# Patient Record
Sex: MALE | Race: BLACK OR AFRICAN AMERICAN | NOT HISPANIC OR LATINO | URBAN - METROPOLITAN AREA
[De-identification: names, ages, dates, MRNs, and addresses within clinical notes are randomized per-mention and may not be internally consistent; named-entity substitution may affect disease eponyms.]

---

## 2017-08-08 ENCOUNTER — OFFICE VISIT - HEALTHEAST (OUTPATIENT)
Dept: ADDICTION MEDICINE | Facility: CLINIC | Age: 32
End: 2017-08-08

## 2017-08-08 DIAGNOSIS — F12.20 CANNABIS DEPENDENCE (H): ICD-10-CM

## 2017-08-10 ENCOUNTER — OFFICE VISIT - HEALTHEAST (OUTPATIENT)
Dept: ADDICTION MEDICINE | Facility: CLINIC | Age: 32
End: 2017-08-10

## 2017-08-10 DIAGNOSIS — F12.20 CANNABIS DEPENDENCE (H): ICD-10-CM

## 2017-08-14 ENCOUNTER — AMBULATORY - HEALTHEAST (OUTPATIENT)
Dept: ADDICTION MEDICINE | Facility: CLINIC | Age: 32
End: 2017-08-14

## 2017-08-15 ENCOUNTER — OFFICE VISIT - HEALTHEAST (OUTPATIENT)
Dept: ADDICTION MEDICINE | Facility: CLINIC | Age: 32
End: 2017-08-15

## 2017-08-15 DIAGNOSIS — F12.20 CANNABIS DEPENDENCE (H): ICD-10-CM

## 2017-08-16 ENCOUNTER — OFFICE VISIT - HEALTHEAST (OUTPATIENT)
Dept: ADDICTION MEDICINE | Facility: CLINIC | Age: 32
End: 2017-08-16

## 2017-08-16 DIAGNOSIS — F12.20 CANNABIS DEPENDENCE (H): ICD-10-CM

## 2017-08-17 ENCOUNTER — OFFICE VISIT - HEALTHEAST (OUTPATIENT)
Dept: ADDICTION MEDICINE | Facility: CLINIC | Age: 32
End: 2017-08-17

## 2017-08-17 DIAGNOSIS — F12.20 CANNABIS DEPENDENCE (H): ICD-10-CM

## 2017-08-18 ENCOUNTER — AMBULATORY - HEALTHEAST (OUTPATIENT)
Dept: ADDICTION MEDICINE | Facility: CLINIC | Age: 32
End: 2017-08-18

## 2017-08-21 ENCOUNTER — COMMUNICATION - HEALTHEAST (OUTPATIENT)
Dept: ADDICTION MEDICINE | Facility: CLINIC | Age: 32
End: 2017-08-21

## 2017-08-21 ENCOUNTER — OFFICE VISIT - HEALTHEAST (OUTPATIENT)
Dept: ADDICTION MEDICINE | Facility: CLINIC | Age: 32
End: 2017-08-21

## 2017-08-21 DIAGNOSIS — F12.20 CANNABIS DEPENDENCE (H): ICD-10-CM

## 2017-08-22 ENCOUNTER — OFFICE VISIT - HEALTHEAST (OUTPATIENT)
Dept: ADDICTION MEDICINE | Facility: CLINIC | Age: 32
End: 2017-08-22

## 2017-08-22 DIAGNOSIS — F12.20 CANNABIS DEPENDENCE (H): ICD-10-CM

## 2017-08-23 ENCOUNTER — OFFICE VISIT - HEALTHEAST (OUTPATIENT)
Dept: ADDICTION MEDICINE | Facility: CLINIC | Age: 32
End: 2017-08-23

## 2017-08-23 DIAGNOSIS — F12.20 CANNABIS DEPENDENCE (H): ICD-10-CM

## 2017-08-24 ENCOUNTER — OFFICE VISIT - HEALTHEAST (OUTPATIENT)
Dept: ADDICTION MEDICINE | Facility: CLINIC | Age: 32
End: 2017-08-24

## 2017-08-24 DIAGNOSIS — F12.20 CANNABIS DEPENDENCE (H): ICD-10-CM

## 2017-08-25 ENCOUNTER — AMBULATORY - HEALTHEAST (OUTPATIENT)
Dept: ADDICTION MEDICINE | Facility: CLINIC | Age: 32
End: 2017-08-25

## 2017-08-28 ENCOUNTER — OFFICE VISIT - HEALTHEAST (OUTPATIENT)
Dept: ADDICTION MEDICINE | Facility: CLINIC | Age: 32
End: 2017-08-28

## 2017-08-28 DIAGNOSIS — F12.20 CANNABIS DEPENDENCE (H): ICD-10-CM

## 2017-08-29 ENCOUNTER — AMBULATORY - HEALTHEAST (OUTPATIENT)
Dept: LAB | Facility: CLINIC | Age: 32
End: 2017-08-29

## 2017-08-29 ENCOUNTER — OFFICE VISIT - HEALTHEAST (OUTPATIENT)
Dept: ADDICTION MEDICINE | Facility: CLINIC | Age: 32
End: 2017-08-29

## 2017-08-29 DIAGNOSIS — F12.20 CANNABIS DEPENDENCE (H): ICD-10-CM

## 2017-08-30 ENCOUNTER — COMMUNICATION - HEALTHEAST (OUTPATIENT)
Dept: ADDICTION MEDICINE | Facility: CLINIC | Age: 32
End: 2017-08-30

## 2017-08-30 ENCOUNTER — OFFICE VISIT - HEALTHEAST (OUTPATIENT)
Dept: ADDICTION MEDICINE | Facility: CLINIC | Age: 32
End: 2017-08-30

## 2017-08-30 DIAGNOSIS — F12.20 CANNABIS DEPENDENCE (H): ICD-10-CM

## 2017-09-01 ENCOUNTER — AMBULATORY - HEALTHEAST (OUTPATIENT)
Dept: ADDICTION MEDICINE | Facility: CLINIC | Age: 32
End: 2017-09-01

## 2017-09-05 ENCOUNTER — OFFICE VISIT - HEALTHEAST (OUTPATIENT)
Dept: ADDICTION MEDICINE | Facility: CLINIC | Age: 32
End: 2017-09-05

## 2017-09-05 DIAGNOSIS — F12.20 CANNABIS DEPENDENCE (H): ICD-10-CM

## 2017-09-06 ENCOUNTER — OFFICE VISIT - HEALTHEAST (OUTPATIENT)
Dept: ADDICTION MEDICINE | Facility: CLINIC | Age: 32
End: 2017-09-06

## 2017-09-06 DIAGNOSIS — F12.20 CANNABIS DEPENDENCE (H): ICD-10-CM

## 2017-09-07 ENCOUNTER — OFFICE VISIT - HEALTHEAST (OUTPATIENT)
Dept: ADDICTION MEDICINE | Facility: CLINIC | Age: 32
End: 2017-09-07

## 2017-09-07 DIAGNOSIS — F12.20 CANNABIS DEPENDENCE (H): ICD-10-CM

## 2017-09-08 ENCOUNTER — AMBULATORY - HEALTHEAST (OUTPATIENT)
Dept: ADDICTION MEDICINE | Facility: CLINIC | Age: 32
End: 2017-09-08

## 2017-09-11 ENCOUNTER — OFFICE VISIT - HEALTHEAST (OUTPATIENT)
Dept: ADDICTION MEDICINE | Facility: CLINIC | Age: 32
End: 2017-09-11

## 2017-09-11 DIAGNOSIS — F12.20 CANNABIS DEPENDENCE (H): ICD-10-CM

## 2017-09-12 ENCOUNTER — AMBULATORY - HEALTHEAST (OUTPATIENT)
Dept: LAB | Facility: CLINIC | Age: 32
End: 2017-09-12

## 2017-09-12 ENCOUNTER — OFFICE VISIT - HEALTHEAST (OUTPATIENT)
Dept: ADDICTION MEDICINE | Facility: CLINIC | Age: 32
End: 2017-09-12

## 2017-09-12 DIAGNOSIS — F12.20 CANNABIS DEPENDENCE (H): ICD-10-CM

## 2017-09-13 ENCOUNTER — OFFICE VISIT - HEALTHEAST (OUTPATIENT)
Dept: ADDICTION MEDICINE | Facility: CLINIC | Age: 32
End: 2017-09-13

## 2017-09-13 DIAGNOSIS — F12.20 CANNABIS DEPENDENCE (H): ICD-10-CM

## 2017-09-14 ENCOUNTER — COMMUNICATION - HEALTHEAST (OUTPATIENT)
Dept: ADDICTION MEDICINE | Facility: CLINIC | Age: 32
End: 2017-09-14

## 2017-09-15 ENCOUNTER — AMBULATORY - HEALTHEAST (OUTPATIENT)
Dept: ADDICTION MEDICINE | Facility: CLINIC | Age: 32
End: 2017-09-15

## 2017-09-18 ENCOUNTER — OFFICE VISIT - HEALTHEAST (OUTPATIENT)
Dept: ADDICTION MEDICINE | Facility: CLINIC | Age: 32
End: 2017-09-18

## 2017-09-18 ENCOUNTER — COMMUNICATION - HEALTHEAST (OUTPATIENT)
Dept: ADDICTION MEDICINE | Facility: CLINIC | Age: 32
End: 2017-09-18

## 2017-09-18 DIAGNOSIS — F12.20 CANNABIS DEPENDENCE (H): ICD-10-CM

## 2017-09-19 ENCOUNTER — OFFICE VISIT - HEALTHEAST (OUTPATIENT)
Dept: ADDICTION MEDICINE | Facility: CLINIC | Age: 32
End: 2017-09-19

## 2017-09-19 DIAGNOSIS — F12.20 CANNABIS DEPENDENCE (H): ICD-10-CM

## 2017-09-20 ENCOUNTER — OFFICE VISIT - HEALTHEAST (OUTPATIENT)
Dept: ADDICTION MEDICINE | Facility: CLINIC | Age: 32
End: 2017-09-20

## 2017-09-20 ENCOUNTER — AMBULATORY - HEALTHEAST (OUTPATIENT)
Dept: ADDICTION MEDICINE | Facility: CLINIC | Age: 32
End: 2017-09-20

## 2017-09-20 DIAGNOSIS — F12.20 CANNABIS DEPENDENCE (H): ICD-10-CM

## 2017-09-21 ENCOUNTER — OFFICE VISIT - HEALTHEAST (OUTPATIENT)
Dept: ADDICTION MEDICINE | Facility: CLINIC | Age: 32
End: 2017-09-21

## 2017-09-21 DIAGNOSIS — F12.20 CANNABIS DEPENDENCE (H): ICD-10-CM

## 2017-09-22 ENCOUNTER — AMBULATORY - HEALTHEAST (OUTPATIENT)
Dept: ADDICTION MEDICINE | Facility: CLINIC | Age: 32
End: 2017-09-22

## 2017-09-25 ENCOUNTER — OFFICE VISIT - HEALTHEAST (OUTPATIENT)
Dept: ADDICTION MEDICINE | Facility: CLINIC | Age: 32
End: 2017-09-25

## 2017-09-25 DIAGNOSIS — F12.20 CANNABIS DEPENDENCE (H): ICD-10-CM

## 2017-09-26 ENCOUNTER — OFFICE VISIT - HEALTHEAST (OUTPATIENT)
Dept: ADDICTION MEDICINE | Facility: CLINIC | Age: 32
End: 2017-09-26

## 2017-09-26 ENCOUNTER — AMBULATORY - HEALTHEAST (OUTPATIENT)
Dept: LAB | Facility: CLINIC | Age: 32
End: 2017-09-26

## 2017-09-26 DIAGNOSIS — F12.20 CANNABIS DEPENDENCE (H): ICD-10-CM

## 2017-09-27 ENCOUNTER — OFFICE VISIT - HEALTHEAST (OUTPATIENT)
Dept: ADDICTION MEDICINE | Facility: CLINIC | Age: 32
End: 2017-09-27

## 2017-09-27 DIAGNOSIS — F12.20 CANNABIS DEPENDENCE (H): ICD-10-CM

## 2017-09-28 ENCOUNTER — OFFICE VISIT - HEALTHEAST (OUTPATIENT)
Dept: ADDICTION MEDICINE | Facility: CLINIC | Age: 32
End: 2017-09-28

## 2017-09-28 DIAGNOSIS — F12.20 CANNABIS DEPENDENCE (H): ICD-10-CM

## 2017-09-29 ENCOUNTER — AMBULATORY - HEALTHEAST (OUTPATIENT)
Dept: ADDICTION MEDICINE | Facility: CLINIC | Age: 32
End: 2017-09-29

## 2017-10-02 ENCOUNTER — OFFICE VISIT - HEALTHEAST (OUTPATIENT)
Dept: ADDICTION MEDICINE | Facility: CLINIC | Age: 32
End: 2017-10-02

## 2017-10-02 ENCOUNTER — AMBULATORY - HEALTHEAST (OUTPATIENT)
Dept: LAB | Facility: CLINIC | Age: 32
End: 2017-10-02

## 2017-10-02 DIAGNOSIS — F12.20 CANNABIS DEPENDENCE (H): ICD-10-CM

## 2017-10-03 ENCOUNTER — OFFICE VISIT - HEALTHEAST (OUTPATIENT)
Dept: ADDICTION MEDICINE | Facility: CLINIC | Age: 32
End: 2017-10-03

## 2017-10-03 DIAGNOSIS — F12.20 CANNABIS DEPENDENCE (H): ICD-10-CM

## 2017-10-04 ENCOUNTER — OFFICE VISIT - HEALTHEAST (OUTPATIENT)
Dept: ADDICTION MEDICINE | Facility: CLINIC | Age: 32
End: 2017-10-04

## 2017-10-04 DIAGNOSIS — F12.20 CANNABIS DEPENDENCE (H): ICD-10-CM

## 2017-10-05 ENCOUNTER — OFFICE VISIT - HEALTHEAST (OUTPATIENT)
Dept: ADDICTION MEDICINE | Facility: CLINIC | Age: 32
End: 2017-10-05

## 2017-10-05 DIAGNOSIS — F12.20 CANNABIS DEPENDENCE (H): ICD-10-CM

## 2017-10-06 ENCOUNTER — AMBULATORY - HEALTHEAST (OUTPATIENT)
Dept: ADDICTION MEDICINE | Facility: CLINIC | Age: 32
End: 2017-10-06

## 2017-10-09 ENCOUNTER — OFFICE VISIT - HEALTHEAST (OUTPATIENT)
Dept: ADDICTION MEDICINE | Facility: CLINIC | Age: 32
End: 2017-10-09

## 2017-10-09 DIAGNOSIS — F12.20 CANNABIS DEPENDENCE (H): ICD-10-CM

## 2017-10-10 ENCOUNTER — OFFICE VISIT - HEALTHEAST (OUTPATIENT)
Dept: ADDICTION MEDICINE | Facility: CLINIC | Age: 32
End: 2017-10-10

## 2017-10-10 ENCOUNTER — AMBULATORY - HEALTHEAST (OUTPATIENT)
Dept: LAB | Facility: CLINIC | Age: 32
End: 2017-10-10

## 2017-10-10 DIAGNOSIS — F12.20 CANNABIS DEPENDENCE (H): ICD-10-CM

## 2017-10-11 ENCOUNTER — OFFICE VISIT - HEALTHEAST (OUTPATIENT)
Dept: ADDICTION MEDICINE | Facility: CLINIC | Age: 32
End: 2017-10-11

## 2017-10-11 DIAGNOSIS — F12.20 CANNABIS DEPENDENCE (H): ICD-10-CM

## 2017-10-12 ENCOUNTER — OFFICE VISIT - HEALTHEAST (OUTPATIENT)
Dept: ADDICTION MEDICINE | Facility: CLINIC | Age: 32
End: 2017-10-12

## 2017-10-12 DIAGNOSIS — F12.20 CANNABIS DEPENDENCE (H): ICD-10-CM

## 2017-10-13 ENCOUNTER — AMBULATORY - HEALTHEAST (OUTPATIENT)
Dept: ADDICTION MEDICINE | Facility: CLINIC | Age: 32
End: 2017-10-13

## 2017-10-16 ENCOUNTER — OFFICE VISIT - HEALTHEAST (OUTPATIENT)
Dept: ADDICTION MEDICINE | Facility: CLINIC | Age: 32
End: 2017-10-16

## 2017-10-16 DIAGNOSIS — F12.20 CANNABIS DEPENDENCE (H): ICD-10-CM

## 2017-10-17 ENCOUNTER — OFFICE VISIT - HEALTHEAST (OUTPATIENT)
Dept: ADDICTION MEDICINE | Facility: CLINIC | Age: 32
End: 2017-10-17

## 2017-10-17 DIAGNOSIS — F12.20 CANNABIS DEPENDENCE (H): ICD-10-CM

## 2017-10-18 ENCOUNTER — OFFICE VISIT - HEALTHEAST (OUTPATIENT)
Dept: ADDICTION MEDICINE | Facility: CLINIC | Age: 32
End: 2017-10-18

## 2017-10-18 ENCOUNTER — AMBULATORY - HEALTHEAST (OUTPATIENT)
Dept: LAB | Facility: CLINIC | Age: 32
End: 2017-10-18

## 2017-10-18 DIAGNOSIS — F12.20 CANNABIS DEPENDENCE (H): ICD-10-CM

## 2017-10-19 ENCOUNTER — OFFICE VISIT - HEALTHEAST (OUTPATIENT)
Dept: ADDICTION MEDICINE | Facility: CLINIC | Age: 32
End: 2017-10-19

## 2017-10-19 DIAGNOSIS — F12.20 CANNABIS DEPENDENCE (H): ICD-10-CM

## 2017-10-20 ENCOUNTER — AMBULATORY - HEALTHEAST (OUTPATIENT)
Dept: ADDICTION MEDICINE | Facility: CLINIC | Age: 32
End: 2017-10-20

## 2017-10-20 ENCOUNTER — COMMUNICATION - HEALTHEAST (OUTPATIENT)
Dept: ADDICTION MEDICINE | Facility: CLINIC | Age: 32
End: 2017-10-20

## 2017-10-23 ENCOUNTER — OFFICE VISIT - HEALTHEAST (OUTPATIENT)
Dept: ADDICTION MEDICINE | Facility: CLINIC | Age: 32
End: 2017-10-23

## 2017-10-23 DIAGNOSIS — F12.20 CANNABIS DEPENDENCE (H): ICD-10-CM

## 2017-10-24 ENCOUNTER — OFFICE VISIT - HEALTHEAST (OUTPATIENT)
Dept: ADDICTION MEDICINE | Facility: CLINIC | Age: 32
End: 2017-10-24

## 2017-10-24 DIAGNOSIS — F12.20 CANNABIS DEPENDENCE (H): ICD-10-CM

## 2017-10-26 ENCOUNTER — AMBULATORY - HEALTHEAST (OUTPATIENT)
Dept: LAB | Facility: CLINIC | Age: 32
End: 2017-10-26

## 2017-10-26 ENCOUNTER — OFFICE VISIT - HEALTHEAST (OUTPATIENT)
Dept: ADDICTION MEDICINE | Facility: CLINIC | Age: 32
End: 2017-10-26

## 2017-10-26 DIAGNOSIS — F12.20 CANNABIS DEPENDENCE (H): ICD-10-CM

## 2017-10-27 ENCOUNTER — AMBULATORY - HEALTHEAST (OUTPATIENT)
Dept: ADDICTION MEDICINE | Facility: CLINIC | Age: 32
End: 2017-10-27

## 2017-10-30 ENCOUNTER — OFFICE VISIT - HEALTHEAST (OUTPATIENT)
Dept: ADDICTION MEDICINE | Facility: CLINIC | Age: 32
End: 2017-10-30

## 2017-10-30 DIAGNOSIS — F12.20 CANNABIS DEPENDENCE (H): ICD-10-CM

## 2017-11-02 ENCOUNTER — AMBULATORY - HEALTHEAST (OUTPATIENT)
Dept: LAB | Facility: CLINIC | Age: 32
End: 2017-11-02

## 2017-11-02 ENCOUNTER — OFFICE VISIT - HEALTHEAST (OUTPATIENT)
Dept: ADDICTION MEDICINE | Facility: CLINIC | Age: 32
End: 2017-11-02

## 2017-11-02 DIAGNOSIS — F12.20 CANNABIS DEPENDENCE (H): ICD-10-CM

## 2017-11-03 ENCOUNTER — AMBULATORY - HEALTHEAST (OUTPATIENT)
Dept: ADDICTION MEDICINE | Facility: CLINIC | Age: 32
End: 2017-11-03

## 2017-11-06 ENCOUNTER — OFFICE VISIT - HEALTHEAST (OUTPATIENT)
Dept: ADDICTION MEDICINE | Facility: CLINIC | Age: 32
End: 2017-11-06

## 2017-11-06 DIAGNOSIS — F12.20 CANNABIS DEPENDENCE (H): ICD-10-CM

## 2017-11-07 ENCOUNTER — AMBULATORY - HEALTHEAST (OUTPATIENT)
Dept: ADDICTION MEDICINE | Facility: CLINIC | Age: 32
End: 2017-11-07

## 2017-11-16 ENCOUNTER — OFFICE VISIT - HEALTHEAST (OUTPATIENT)
Dept: ADDICTION MEDICINE | Facility: CLINIC | Age: 32
End: 2017-11-16

## 2017-11-16 DIAGNOSIS — F12.20 CANNABIS DEPENDENCE (H): ICD-10-CM

## 2017-11-20 ENCOUNTER — AMBULATORY - HEALTHEAST (OUTPATIENT)
Dept: ADDICTION MEDICINE | Facility: CLINIC | Age: 32
End: 2017-11-20

## 2017-11-30 ENCOUNTER — OFFICE VISIT - HEALTHEAST (OUTPATIENT)
Dept: ADDICTION MEDICINE | Facility: CLINIC | Age: 32
End: 2017-11-30

## 2017-11-30 DIAGNOSIS — F12.20 CANNABIS DEPENDENCE (H): ICD-10-CM

## 2017-12-06 ENCOUNTER — AMBULATORY - HEALTHEAST (OUTPATIENT)
Dept: ADDICTION MEDICINE | Facility: CLINIC | Age: 32
End: 2017-12-06

## 2017-12-14 ENCOUNTER — OFFICE VISIT - HEALTHEAST (OUTPATIENT)
Dept: ADDICTION MEDICINE | Facility: CLINIC | Age: 32
End: 2017-12-14

## 2017-12-14 DIAGNOSIS — F12.20 CANNABIS DEPENDENCE (H): ICD-10-CM

## 2017-12-18 ENCOUNTER — AMBULATORY - HEALTHEAST (OUTPATIENT)
Dept: ADDICTION MEDICINE | Facility: CLINIC | Age: 32
End: 2017-12-18

## 2017-12-21 ENCOUNTER — OFFICE VISIT - HEALTHEAST (OUTPATIENT)
Dept: ADDICTION MEDICINE | Facility: CLINIC | Age: 32
End: 2017-12-21

## 2017-12-21 DIAGNOSIS — F12.20 CANNABIS DEPENDENCE (H): ICD-10-CM

## 2017-12-27 ENCOUNTER — AMBULATORY - HEALTHEAST (OUTPATIENT)
Dept: ADDICTION MEDICINE | Facility: CLINIC | Age: 32
End: 2017-12-27

## 2017-12-28 ENCOUNTER — OFFICE VISIT - HEALTHEAST (OUTPATIENT)
Dept: ADDICTION MEDICINE | Facility: CLINIC | Age: 32
End: 2017-12-28

## 2017-12-28 DIAGNOSIS — F12.20 CANNABIS DEPENDENCE (H): ICD-10-CM

## 2018-01-02 ENCOUNTER — AMBULATORY - HEALTHEAST (OUTPATIENT)
Dept: ADDICTION MEDICINE | Facility: CLINIC | Age: 33
End: 2018-01-02

## 2018-01-04 ENCOUNTER — OFFICE VISIT - HEALTHEAST (OUTPATIENT)
Dept: ADDICTION MEDICINE | Facility: CLINIC | Age: 33
End: 2018-01-04

## 2018-01-04 DIAGNOSIS — F12.20 CANNABIS DEPENDENCE (H): ICD-10-CM

## 2018-01-08 ENCOUNTER — AMBULATORY - HEALTHEAST (OUTPATIENT)
Dept: ADDICTION MEDICINE | Facility: CLINIC | Age: 33
End: 2018-01-08

## 2018-01-11 ENCOUNTER — OFFICE VISIT - HEALTHEAST (OUTPATIENT)
Dept: ADDICTION MEDICINE | Facility: CLINIC | Age: 33
End: 2018-01-11

## 2018-01-11 DIAGNOSIS — F12.20 CANNABIS DEPENDENCE (H): ICD-10-CM

## 2018-01-15 ENCOUNTER — AMBULATORY - HEALTHEAST (OUTPATIENT)
Dept: ADDICTION MEDICINE | Facility: CLINIC | Age: 33
End: 2018-01-15

## 2018-01-18 ENCOUNTER — OFFICE VISIT - HEALTHEAST (OUTPATIENT)
Dept: ADDICTION MEDICINE | Facility: CLINIC | Age: 33
End: 2018-01-18

## 2018-01-18 ENCOUNTER — AMBULATORY - HEALTHEAST (OUTPATIENT)
Dept: ADDICTION MEDICINE | Facility: CLINIC | Age: 33
End: 2018-01-18

## 2018-01-18 DIAGNOSIS — F12.20 CANNABIS DEPENDENCE (H): ICD-10-CM

## 2018-01-22 ENCOUNTER — AMBULATORY - HEALTHEAST (OUTPATIENT)
Dept: ADDICTION MEDICINE | Facility: CLINIC | Age: 33
End: 2018-01-22

## 2018-01-25 ENCOUNTER — OFFICE VISIT - HEALTHEAST (OUTPATIENT)
Dept: ADDICTION MEDICINE | Facility: CLINIC | Age: 33
End: 2018-01-25

## 2018-01-25 DIAGNOSIS — F12.20 CANNABIS DEPENDENCE (H): ICD-10-CM

## 2018-01-29 ENCOUNTER — AMBULATORY - HEALTHEAST (OUTPATIENT)
Dept: ADDICTION MEDICINE | Facility: CLINIC | Age: 33
End: 2018-01-29

## 2018-02-01 ENCOUNTER — OFFICE VISIT - HEALTHEAST (OUTPATIENT)
Dept: ADDICTION MEDICINE | Facility: CLINIC | Age: 33
End: 2018-02-01

## 2018-02-01 DIAGNOSIS — F12.20 CANNABIS DEPENDENCE (H): ICD-10-CM

## 2018-02-05 ENCOUNTER — COMMUNICATION - HEALTHEAST (OUTPATIENT)
Dept: ADDICTION MEDICINE | Facility: CLINIC | Age: 33
End: 2018-02-05

## 2018-02-05 ENCOUNTER — AMBULATORY - HEALTHEAST (OUTPATIENT)
Dept: ADDICTION MEDICINE | Facility: CLINIC | Age: 33
End: 2018-02-05

## 2018-02-08 ENCOUNTER — AMBULATORY - HEALTHEAST (OUTPATIENT)
Dept: LAB | Facility: CLINIC | Age: 33
End: 2018-02-08

## 2018-02-08 ENCOUNTER — OFFICE VISIT - HEALTHEAST (OUTPATIENT)
Dept: ADDICTION MEDICINE | Facility: CLINIC | Age: 33
End: 2018-02-08

## 2018-02-08 DIAGNOSIS — F12.20 CANNABIS DEPENDENCE (H): ICD-10-CM

## 2018-02-08 LAB
AMPHETAMINES UR QL SCN: NORMAL
BARBITURATES UR QL: NORMAL
BENZODIAZ UR QL: NORMAL
CANNABINOIDS UR QL SCN: NORMAL
COCAINE UR QL: NORMAL
CREAT UR-MCNC: 332.8 MG/DL
METHADONE UR QL SCN: NORMAL
OPIATES UR QL SCN: NORMAL
OXYCODONE UR QL: NORMAL
PCP UR QL SCN: NORMAL

## 2018-02-09 ENCOUNTER — AMBULATORY - HEALTHEAST (OUTPATIENT)
Dept: ADDICTION MEDICINE | Facility: CLINIC | Age: 33
End: 2018-02-09

## 2018-02-13 ENCOUNTER — OFFICE VISIT - HEALTHEAST (OUTPATIENT)
Dept: ADDICTION MEDICINE | Facility: CLINIC | Age: 33
End: 2018-02-13

## 2018-02-13 ENCOUNTER — AMBULATORY - HEALTHEAST (OUTPATIENT)
Dept: LAB | Facility: CLINIC | Age: 33
End: 2018-02-13

## 2018-02-13 DIAGNOSIS — F12.20 CANNABIS DEPENDENCE (H): ICD-10-CM

## 2018-02-13 LAB
AMPHETAMINES UR QL SCN: NORMAL
BARBITURATES UR QL: NORMAL
BENZODIAZ UR QL: NORMAL
CANNABINOIDS UR QL SCN: NORMAL
COCAINE UR QL: NORMAL
CREAT UR-MCNC: 196.5 MG/DL
METHADONE UR QL SCN: NORMAL
OPIATES UR QL SCN: NORMAL
OXYCODONE UR QL: NORMAL
PCP UR QL SCN: NORMAL

## 2018-02-15 ENCOUNTER — OFFICE VISIT - HEALTHEAST (OUTPATIENT)
Dept: ADDICTION MEDICINE | Facility: CLINIC | Age: 33
End: 2018-02-15

## 2018-02-15 ENCOUNTER — AMBULATORY - HEALTHEAST (OUTPATIENT)
Dept: LAB | Facility: CLINIC | Age: 33
End: 2018-02-15

## 2018-02-15 DIAGNOSIS — F12.20 CANNABIS DEPENDENCE (H): ICD-10-CM

## 2018-02-15 LAB
AMPHETAMINES UR QL SCN: NORMAL
BARBITURATES UR QL: NORMAL
BENZODIAZ UR QL: NORMAL
CANNABINOIDS UR QL SCN: NORMAL
COCAINE UR QL: NORMAL
CREAT UR-MCNC: 295.8 MG/DL
METHADONE UR QL SCN: NORMAL
OPIATES UR QL SCN: NORMAL
OXYCODONE UR QL: NORMAL
PCP UR QL SCN: NORMAL

## 2018-02-16 ENCOUNTER — AMBULATORY - HEALTHEAST (OUTPATIENT)
Dept: ADDICTION MEDICINE | Facility: CLINIC | Age: 33
End: 2018-02-16

## 2018-02-20 ENCOUNTER — OFFICE VISIT - HEALTHEAST (OUTPATIENT)
Dept: ADDICTION MEDICINE | Facility: CLINIC | Age: 33
End: 2018-02-20

## 2018-02-20 ENCOUNTER — AMBULATORY - HEALTHEAST (OUTPATIENT)
Dept: LAB | Facility: CLINIC | Age: 33
End: 2018-02-20

## 2018-02-20 DIAGNOSIS — F12.20 CANNABIS DEPENDENCE (H): ICD-10-CM

## 2018-02-20 LAB
AMPHETAMINES UR QL SCN: NORMAL
BARBITURATES UR QL: NORMAL
BENZODIAZ UR QL: NORMAL
CANNABINOIDS UR QL SCN: NORMAL
COCAINE UR QL: NORMAL
CREAT UR-MCNC: 90.1 MG/DL
METHADONE UR QL SCN: NORMAL
OPIATES UR QL SCN: NORMAL
OXYCODONE UR QL: NORMAL
PCP UR QL SCN: NORMAL

## 2018-02-22 ENCOUNTER — AMBULATORY - HEALTHEAST (OUTPATIENT)
Dept: LAB | Facility: CLINIC | Age: 33
End: 2018-02-22

## 2018-02-22 ENCOUNTER — OFFICE VISIT - HEALTHEAST (OUTPATIENT)
Dept: ADDICTION MEDICINE | Facility: CLINIC | Age: 33
End: 2018-02-22

## 2018-02-22 DIAGNOSIS — F12.20 CANNABIS DEPENDENCE (H): ICD-10-CM

## 2018-02-22 LAB
AMPHETAMINES UR QL SCN: NORMAL
BARBITURATES UR QL: NORMAL
BENZODIAZ UR QL: NORMAL
CANNABINOIDS UR QL SCN: NORMAL
COCAINE UR QL: NORMAL
CREAT UR-MCNC: 282.6 MG/DL
METHADONE UR QL SCN: NORMAL
OPIATES UR QL SCN: NORMAL
OXYCODONE UR QL: NORMAL
PCP UR QL SCN: NORMAL

## 2018-02-23 ENCOUNTER — AMBULATORY - HEALTHEAST (OUTPATIENT)
Dept: ADDICTION MEDICINE | Facility: CLINIC | Age: 33
End: 2018-02-23

## 2018-02-27 ENCOUNTER — OFFICE VISIT - HEALTHEAST (OUTPATIENT)
Dept: ADDICTION MEDICINE | Facility: CLINIC | Age: 33
End: 2018-02-27

## 2018-02-27 ENCOUNTER — AMBULATORY - HEALTHEAST (OUTPATIENT)
Dept: LAB | Facility: CLINIC | Age: 33
End: 2018-02-27

## 2018-02-27 DIAGNOSIS — F12.20 CANNABIS DEPENDENCE (H): ICD-10-CM

## 2018-02-27 LAB
AMPHETAMINES UR QL SCN: NORMAL
BARBITURATES UR QL: NORMAL
BENZODIAZ UR QL: NORMAL
CANNABINOIDS UR QL SCN: NORMAL
COCAINE UR QL: NORMAL
CREAT UR-MCNC: 416 MG/DL
METHADONE UR QL SCN: NORMAL
OPIATES UR QL SCN: NORMAL
OXYCODONE UR QL: NORMAL
PCP UR QL SCN: NORMAL

## 2018-02-28 ENCOUNTER — COMMUNICATION - HEALTHEAST (OUTPATIENT)
Dept: INTERNAL MEDICINE | Facility: CLINIC | Age: 33
End: 2018-02-28

## 2018-03-01 ENCOUNTER — OFFICE VISIT - HEALTHEAST (OUTPATIENT)
Dept: ADDICTION MEDICINE | Facility: CLINIC | Age: 33
End: 2018-03-01

## 2018-03-01 ENCOUNTER — OFFICE VISIT - HEALTHEAST (OUTPATIENT)
Dept: INTERNAL MEDICINE | Facility: CLINIC | Age: 33
End: 2018-03-01

## 2018-03-01 ENCOUNTER — AMBULATORY - HEALTHEAST (OUTPATIENT)
Dept: LAB | Facility: CLINIC | Age: 33
End: 2018-03-01

## 2018-03-01 DIAGNOSIS — Z71.6 ENCOUNTER FOR SMOKING CESSATION COUNSELING: ICD-10-CM

## 2018-03-01 DIAGNOSIS — F12.20 CANNABIS DEPENDENCE (H): ICD-10-CM

## 2018-03-01 LAB
AMPHETAMINES UR QL SCN: NORMAL
BARBITURATES UR QL: NORMAL
BENZODIAZ UR QL: NORMAL
CANNABINOIDS UR QL SCN: NORMAL
COCAINE UR QL: NORMAL
CREAT UR-MCNC: 181.2 MG/DL
METHADONE UR QL SCN: NORMAL
OPIATES UR QL SCN: NORMAL
OXYCODONE UR QL: NORMAL
PCP UR QL SCN: NORMAL

## 2018-03-01 ASSESSMENT — MIFFLIN-ST. JEOR: SCORE: 1737.21

## 2018-03-02 ENCOUNTER — AMBULATORY - HEALTHEAST (OUTPATIENT)
Dept: ADDICTION MEDICINE | Facility: CLINIC | Age: 33
End: 2018-03-02

## 2018-03-06 ENCOUNTER — OFFICE VISIT - HEALTHEAST (OUTPATIENT)
Dept: ADDICTION MEDICINE | Facility: CLINIC | Age: 33
End: 2018-03-06

## 2018-03-06 ENCOUNTER — AMBULATORY - HEALTHEAST (OUTPATIENT)
Dept: LAB | Facility: CLINIC | Age: 33
End: 2018-03-06

## 2018-03-06 DIAGNOSIS — F12.20 CANNABIS DEPENDENCE (H): ICD-10-CM

## 2018-03-06 LAB
AMPHETAMINES UR QL SCN: NORMAL
BARBITURATES UR QL: NORMAL
BENZODIAZ UR QL: NORMAL
CANNABINOIDS UR QL SCN: NORMAL
COCAINE UR QL: NORMAL
CREAT UR-MCNC: 435.2 MG/DL
METHADONE UR QL SCN: NORMAL
OPIATES UR QL SCN: NORMAL
OXYCODONE UR QL: NORMAL
PCP UR QL SCN: NORMAL

## 2018-03-08 ENCOUNTER — OFFICE VISIT - HEALTHEAST (OUTPATIENT)
Dept: ADDICTION MEDICINE | Facility: CLINIC | Age: 33
End: 2018-03-08

## 2018-03-08 ENCOUNTER — AMBULATORY - HEALTHEAST (OUTPATIENT)
Dept: LAB | Facility: CLINIC | Age: 33
End: 2018-03-08

## 2018-03-08 DIAGNOSIS — F12.20 CANNABIS DEPENDENCE (H): ICD-10-CM

## 2018-03-08 LAB
AMPHETAMINES UR QL SCN: NORMAL
BARBITURATES UR QL: NORMAL
BENZODIAZ UR QL: NORMAL
CANNABINOIDS UR QL SCN: NORMAL
COCAINE UR QL: NORMAL
CREAT UR-MCNC: 117.6 MG/DL
METHADONE UR QL SCN: NORMAL
OPIATES UR QL SCN: NORMAL
OXYCODONE UR QL: NORMAL
PCP UR QL SCN: NORMAL

## 2018-03-11 ENCOUNTER — AMBULATORY - HEALTHEAST (OUTPATIENT)
Dept: ADDICTION MEDICINE | Facility: CLINIC | Age: 33
End: 2018-03-11

## 2018-03-13 ENCOUNTER — AMBULATORY - HEALTHEAST (OUTPATIENT)
Dept: LAB | Facility: CLINIC | Age: 33
End: 2018-03-13

## 2018-03-13 ENCOUNTER — OFFICE VISIT - HEALTHEAST (OUTPATIENT)
Dept: ADDICTION MEDICINE | Facility: CLINIC | Age: 33
End: 2018-03-13

## 2018-03-13 DIAGNOSIS — F12.20 CANNABIS DEPENDENCE (H): ICD-10-CM

## 2018-03-13 LAB
AMPHETAMINES UR QL SCN: NORMAL
BARBITURATES UR QL: NORMAL
BENZODIAZ UR QL: NORMAL
CANNABINOIDS UR QL SCN: NORMAL
COCAINE UR QL: NORMAL
CREAT UR-MCNC: 23.3 MG/DL
METHADONE UR QL SCN: NORMAL
OPIATES UR QL SCN: NORMAL
OXYCODONE UR QL: NORMAL
PCP UR QL SCN: NORMAL

## 2018-03-15 ENCOUNTER — OFFICE VISIT - HEALTHEAST (OUTPATIENT)
Dept: ADDICTION MEDICINE | Facility: CLINIC | Age: 33
End: 2018-03-15

## 2018-03-15 ENCOUNTER — AMBULATORY - HEALTHEAST (OUTPATIENT)
Dept: ADDICTION MEDICINE | Facility: CLINIC | Age: 33
End: 2018-03-15

## 2018-03-15 ENCOUNTER — AMBULATORY - HEALTHEAST (OUTPATIENT)
Dept: LAB | Facility: CLINIC | Age: 33
End: 2018-03-15

## 2018-03-15 DIAGNOSIS — F12.20 CANNABIS DEPENDENCE (H): ICD-10-CM

## 2018-03-15 LAB
AMPHETAMINES UR QL SCN: NORMAL
BARBITURATES UR QL: NORMAL
BENZODIAZ UR QL: NORMAL
CANNABINOIDS UR QL SCN: NORMAL
COCAINE UR QL: NORMAL
CREAT UR-MCNC: 139.5 MG/DL
METHADONE UR QL SCN: NORMAL
OPIATES UR QL SCN: NORMAL
OXYCODONE UR QL: NORMAL
PCP UR QL SCN: NORMAL

## 2018-03-20 ENCOUNTER — OFFICE VISIT - HEALTHEAST (OUTPATIENT)
Dept: ADDICTION MEDICINE | Facility: CLINIC | Age: 33
End: 2018-03-20

## 2018-03-20 ENCOUNTER — AMBULATORY - HEALTHEAST (OUTPATIENT)
Dept: LAB | Facility: CLINIC | Age: 33
End: 2018-03-20

## 2018-03-20 DIAGNOSIS — F12.20 CANNABIS DEPENDENCE (H): ICD-10-CM

## 2018-03-20 LAB
AMPHETAMINES UR QL SCN: NORMAL
BARBITURATES UR QL: NORMAL
BENZODIAZ UR QL: NORMAL
CANNABINOIDS UR QL SCN: NORMAL
COCAINE UR QL: NORMAL
CREAT UR-MCNC: 163.1 MG/DL
METHADONE UR QL SCN: NORMAL
OPIATES UR QL SCN: NORMAL
OXYCODONE UR QL: NORMAL
PCP UR QL SCN: NORMAL

## 2018-03-22 ENCOUNTER — OFFICE VISIT - HEALTHEAST (OUTPATIENT)
Dept: ADDICTION MEDICINE | Facility: CLINIC | Age: 33
End: 2018-03-22

## 2018-03-22 ENCOUNTER — AMBULATORY - HEALTHEAST (OUTPATIENT)
Dept: LAB | Facility: CLINIC | Age: 33
End: 2018-03-22

## 2018-03-22 DIAGNOSIS — F12.20 CANNABIS DEPENDENCE (H): ICD-10-CM

## 2018-03-22 LAB
AMPHETAMINES UR QL SCN: NORMAL
BARBITURATES UR QL: NORMAL
BENZODIAZ UR QL: NORMAL
CANNABINOIDS UR QL SCN: NORMAL
COCAINE UR QL: NORMAL
CREAT UR-MCNC: 165.4 MG/DL
METHADONE UR QL SCN: NORMAL
OPIATES UR QL SCN: NORMAL
OXYCODONE UR QL: NORMAL
PCP UR QL SCN: NORMAL

## 2018-03-23 ENCOUNTER — AMBULATORY - HEALTHEAST (OUTPATIENT)
Dept: ADDICTION MEDICINE | Facility: CLINIC | Age: 33
End: 2018-03-23

## 2018-03-28 ENCOUNTER — AMBULATORY - HEALTHEAST (OUTPATIENT)
Dept: ADDICTION MEDICINE | Facility: CLINIC | Age: 33
End: 2018-03-28

## 2018-03-28 ENCOUNTER — OFFICE VISIT - HEALTHEAST (OUTPATIENT)
Dept: ADDICTION MEDICINE | Facility: CLINIC | Age: 33
End: 2018-03-28

## 2018-03-28 ENCOUNTER — AMBULATORY - HEALTHEAST (OUTPATIENT)
Dept: LAB | Facility: CLINIC | Age: 33
End: 2018-03-28

## 2018-03-28 DIAGNOSIS — F12.20 CANNABIS DEPENDENCE (H): ICD-10-CM

## 2018-03-28 LAB
AMPHETAMINES UR QL SCN: NORMAL
BARBITURATES UR QL: NORMAL
BENZODIAZ UR QL: NORMAL
CANNABINOIDS UR QL SCN: NORMAL
COCAINE UR QL: NORMAL
CREAT UR-MCNC: 167.5 MG/DL
METHADONE UR QL SCN: NORMAL
OPIATES UR QL SCN: NORMAL
OXYCODONE UR QL: NORMAL
PCP UR QL SCN: NORMAL

## 2018-03-29 ENCOUNTER — OFFICE VISIT - HEALTHEAST (OUTPATIENT)
Dept: ADDICTION MEDICINE | Facility: CLINIC | Age: 33
End: 2018-03-29

## 2018-03-29 ENCOUNTER — AMBULATORY - HEALTHEAST (OUTPATIENT)
Dept: LAB | Facility: CLINIC | Age: 33
End: 2018-03-29

## 2018-03-29 DIAGNOSIS — F12.20 CANNABIS DEPENDENCE (H): ICD-10-CM

## 2018-03-29 LAB
AMPHETAMINES UR QL SCN: NORMAL
BARBITURATES UR QL: NORMAL
BENZODIAZ UR QL: NORMAL
CANNABINOIDS UR QL SCN: NORMAL
COCAINE UR QL: NORMAL
CREAT UR-MCNC: 273 MG/DL
METHADONE UR QL SCN: NORMAL
OPIATES UR QL SCN: NORMAL
OXYCODONE UR QL: NORMAL
PCP UR QL SCN: NORMAL

## 2018-03-30 ENCOUNTER — AMBULATORY - HEALTHEAST (OUTPATIENT)
Dept: ADDICTION MEDICINE | Facility: CLINIC | Age: 33
End: 2018-03-30

## 2018-04-03 ENCOUNTER — AMBULATORY - HEALTHEAST (OUTPATIENT)
Dept: LAB | Facility: CLINIC | Age: 33
End: 2018-04-03

## 2018-04-03 ENCOUNTER — OFFICE VISIT - HEALTHEAST (OUTPATIENT)
Dept: ADDICTION MEDICINE | Facility: CLINIC | Age: 33
End: 2018-04-03

## 2018-04-03 DIAGNOSIS — F12.20 CANNABIS DEPENDENCE (H): ICD-10-CM

## 2018-04-03 LAB
AMPHETAMINES UR QL SCN: NORMAL
BARBITURATES UR QL: NORMAL
BENZODIAZ UR QL: NORMAL
CANNABINOIDS UR QL SCN: NORMAL
COCAINE UR QL: NORMAL
CREAT UR-MCNC: 380.2 MG/DL
METHADONE UR QL SCN: NORMAL
OPIATES UR QL SCN: NORMAL
OXYCODONE UR QL: NORMAL
PCP UR QL SCN: NORMAL

## 2018-04-05 ENCOUNTER — OFFICE VISIT - HEALTHEAST (OUTPATIENT)
Dept: ADDICTION MEDICINE | Facility: CLINIC | Age: 33
End: 2018-04-05

## 2018-04-05 DIAGNOSIS — F12.20 CANNABIS DEPENDENCE (H): ICD-10-CM

## 2018-04-05 LAB
AMPHETAMINES UR QL SCN: NORMAL
BARBITURATES UR QL: NORMAL
BENZODIAZ UR QL: NORMAL
CANNABINOIDS UR QL SCN: NORMAL
COCAINE UR QL: NORMAL
CREAT UR-MCNC: 366.1 MG/DL
METHADONE UR QL SCN: NORMAL
OPIATES UR QL SCN: NORMAL
OXYCODONE UR QL: NORMAL
PCP UR QL SCN: NORMAL

## 2018-04-07 ENCOUNTER — AMBULATORY - HEALTHEAST (OUTPATIENT)
Dept: ADDICTION MEDICINE | Facility: CLINIC | Age: 33
End: 2018-04-07

## 2018-04-10 ENCOUNTER — OFFICE VISIT - HEALTHEAST (OUTPATIENT)
Dept: ADDICTION MEDICINE | Facility: CLINIC | Age: 33
End: 2018-04-10

## 2018-04-10 ENCOUNTER — AMBULATORY - HEALTHEAST (OUTPATIENT)
Dept: LAB | Facility: CLINIC | Age: 33
End: 2018-04-10

## 2018-04-10 DIAGNOSIS — F12.20 CANNABIS DEPENDENCE (H): ICD-10-CM

## 2018-04-10 LAB
AMPHETAMINES UR QL SCN: NORMAL
BARBITURATES UR QL: NORMAL
BENZODIAZ UR QL: NORMAL
CANNABINOIDS UR QL SCN: NORMAL
COCAINE UR QL: NORMAL
CREAT UR-MCNC: 563.4 MG/DL
METHADONE UR QL SCN: NORMAL
OPIATES UR QL SCN: NORMAL
OXYCODONE UR QL: NORMAL
PCP UR QL SCN: NORMAL

## 2018-04-12 ENCOUNTER — OFFICE VISIT - HEALTHEAST (OUTPATIENT)
Dept: ADDICTION MEDICINE | Facility: CLINIC | Age: 33
End: 2018-04-12

## 2018-04-12 ENCOUNTER — AMBULATORY - HEALTHEAST (OUTPATIENT)
Dept: LAB | Facility: CLINIC | Age: 33
End: 2018-04-12

## 2018-04-12 DIAGNOSIS — F12.20 CANNABIS DEPENDENCE (H): ICD-10-CM

## 2018-04-12 LAB
AMPHETAMINES UR QL SCN: NORMAL
BARBITURATES UR QL: NORMAL
BENZODIAZ UR QL: NORMAL
CANNABINOIDS UR QL SCN: NORMAL
COCAINE UR QL: NORMAL
CREAT UR-MCNC: 523.9 MG/DL
METHADONE UR QL SCN: NORMAL
OPIATES UR QL SCN: NORMAL
OXYCODONE UR QL: NORMAL
PCP UR QL SCN: NORMAL

## 2018-04-15 ENCOUNTER — AMBULATORY - HEALTHEAST (OUTPATIENT)
Dept: ADDICTION MEDICINE | Facility: CLINIC | Age: 33
End: 2018-04-15

## 2018-04-19 ENCOUNTER — AMBULATORY - HEALTHEAST (OUTPATIENT)
Dept: ADDICTION MEDICINE | Facility: CLINIC | Age: 33
End: 2018-04-19

## 2018-04-24 ENCOUNTER — OFFICE VISIT - HEALTHEAST (OUTPATIENT)
Dept: ADDICTION MEDICINE | Facility: CLINIC | Age: 33
End: 2018-04-24

## 2018-04-24 ENCOUNTER — AMBULATORY - HEALTHEAST (OUTPATIENT)
Dept: LAB | Facility: CLINIC | Age: 33
End: 2018-04-24

## 2018-04-24 DIAGNOSIS — F12.20 CANNABIS DEPENDENCE (H): ICD-10-CM

## 2018-04-24 LAB
AMPHETAMINES UR QL SCN: ABNORMAL
BARBITURATES UR QL: ABNORMAL
BENZODIAZ UR QL: ABNORMAL
CANNABINOIDS UR QL SCN: ABNORMAL
COCAINE UR QL: ABNORMAL
CREAT UR-MCNC: 255.5 MG/DL
METHADONE UR QL SCN: ABNORMAL
OPIATES UR QL SCN: ABNORMAL
OXYCODONE UR QL: ABNORMAL
PCP UR QL SCN: ABNORMAL

## 2018-04-26 ENCOUNTER — OFFICE VISIT - HEALTHEAST (OUTPATIENT)
Dept: ADDICTION MEDICINE | Facility: CLINIC | Age: 33
End: 2018-04-26

## 2018-04-26 ENCOUNTER — AMBULATORY - HEALTHEAST (OUTPATIENT)
Dept: LAB | Facility: CLINIC | Age: 33
End: 2018-04-26

## 2018-04-26 DIAGNOSIS — F12.20 CANNABIS DEPENDENCE (H): ICD-10-CM

## 2018-04-26 LAB
AMPHETAMINES UR QL SCN: ABNORMAL
BARBITURATES UR QL: ABNORMAL
BENZODIAZ UR QL: ABNORMAL
CANNABINOIDS UR QL SCN: ABNORMAL
COCAINE UR QL: ABNORMAL
CREAT UR-MCNC: 182 MG/DL
METHADONE UR QL SCN: ABNORMAL
OPIATES UR QL SCN: ABNORMAL
OXYCODONE UR QL: ABNORMAL
PCP UR QL SCN: ABNORMAL

## 2018-04-27 ENCOUNTER — AMBULATORY - HEALTHEAST (OUTPATIENT)
Dept: ADDICTION MEDICINE | Facility: CLINIC | Age: 33
End: 2018-04-27

## 2018-05-01 ENCOUNTER — AMBULATORY - HEALTHEAST (OUTPATIENT)
Dept: ADDICTION MEDICINE | Facility: CLINIC | Age: 33
End: 2018-05-01

## 2018-05-03 ENCOUNTER — OFFICE VISIT - HEALTHEAST (OUTPATIENT)
Dept: ADDICTION MEDICINE | Facility: CLINIC | Age: 33
End: 2018-05-03

## 2018-05-03 ENCOUNTER — AMBULATORY - HEALTHEAST (OUTPATIENT)
Dept: LAB | Facility: CLINIC | Age: 33
End: 2018-05-03

## 2018-05-03 DIAGNOSIS — F12.20 CANNABIS DEPENDENCE (H): ICD-10-CM

## 2018-05-03 LAB
AMPHETAMINES UR QL SCN: ABNORMAL
BARBITURATES UR QL: ABNORMAL
BENZODIAZ UR QL: ABNORMAL
CANNABINOIDS UR QL SCN: ABNORMAL
COCAINE UR QL: ABNORMAL
CREAT UR-MCNC: 142.9 MG/DL
METHADONE UR QL SCN: ABNORMAL
OPIATES UR QL SCN: ABNORMAL
OXYCODONE UR QL: ABNORMAL
PCP UR QL SCN: ABNORMAL

## 2018-05-04 ENCOUNTER — AMBULATORY - HEALTHEAST (OUTPATIENT)
Dept: ADDICTION MEDICINE | Facility: CLINIC | Age: 33
End: 2018-05-04

## 2018-05-10 ENCOUNTER — OFFICE VISIT - HEALTHEAST (OUTPATIENT)
Dept: ADDICTION MEDICINE | Facility: CLINIC | Age: 33
End: 2018-05-10

## 2018-05-10 ENCOUNTER — AMBULATORY - HEALTHEAST (OUTPATIENT)
Dept: LAB | Facility: CLINIC | Age: 33
End: 2018-05-10

## 2018-05-10 DIAGNOSIS — F12.20 CANNABIS DEPENDENCE (H): ICD-10-CM

## 2018-05-10 LAB
AMPHETAMINES UR QL SCN: ABNORMAL
BARBITURATES UR QL: ABNORMAL
BENZODIAZ UR QL: ABNORMAL
CANNABINOIDS UR QL SCN: ABNORMAL
COCAINE UR QL: ABNORMAL
CREAT UR-MCNC: 237.5 MG/DL
METHADONE UR QL SCN: ABNORMAL
OPIATES UR QL SCN: ABNORMAL
OXYCODONE UR QL: ABNORMAL
PCP UR QL SCN: ABNORMAL

## 2018-05-11 ENCOUNTER — AMBULATORY - HEALTHEAST (OUTPATIENT)
Dept: ADDICTION MEDICINE | Facility: CLINIC | Age: 33
End: 2018-05-11

## 2018-05-15 ENCOUNTER — OFFICE VISIT - HEALTHEAST (OUTPATIENT)
Dept: ADDICTION MEDICINE | Facility: CLINIC | Age: 33
End: 2018-05-15

## 2018-05-15 ENCOUNTER — AMBULATORY - HEALTHEAST (OUTPATIENT)
Dept: LAB | Facility: CLINIC | Age: 33
End: 2018-05-15

## 2018-05-15 DIAGNOSIS — F12.20 CANNABIS DEPENDENCE (H): ICD-10-CM

## 2018-05-15 LAB
AMPHETAMINES UR QL SCN: ABNORMAL
BARBITURATES UR QL: ABNORMAL
BENZODIAZ UR QL: ABNORMAL
CANNABINOIDS UR QL SCN: ABNORMAL
COCAINE UR QL: ABNORMAL
CREAT UR-MCNC: 29.3 MG/DL
METHADONE UR QL SCN: ABNORMAL
OPIATES UR QL SCN: ABNORMAL
OXYCODONE UR QL: ABNORMAL
PCP UR QL SCN: ABNORMAL

## 2018-05-17 ENCOUNTER — OFFICE VISIT - HEALTHEAST (OUTPATIENT)
Dept: ADDICTION MEDICINE | Facility: CLINIC | Age: 33
End: 2018-05-17

## 2018-05-17 ENCOUNTER — AMBULATORY - HEALTHEAST (OUTPATIENT)
Dept: LAB | Facility: CLINIC | Age: 33
End: 2018-05-17

## 2018-05-17 DIAGNOSIS — F12.20 CANNABIS DEPENDENCE (H): ICD-10-CM

## 2018-05-18 ENCOUNTER — AMBULATORY - HEALTHEAST (OUTPATIENT)
Dept: ADDICTION MEDICINE | Facility: CLINIC | Age: 33
End: 2018-05-18

## 2018-05-22 ENCOUNTER — OFFICE VISIT - HEALTHEAST (OUTPATIENT)
Dept: ADDICTION MEDICINE | Facility: CLINIC | Age: 33
End: 2018-05-22

## 2018-05-22 ENCOUNTER — AMBULATORY - HEALTHEAST (OUTPATIENT)
Dept: LAB | Facility: CLINIC | Age: 33
End: 2018-05-22

## 2018-05-22 DIAGNOSIS — F12.20 CANNABIS DEPENDENCE (H): ICD-10-CM

## 2018-05-22 LAB
AMPHETAMINES UR QL SCN: ABNORMAL
BARBITURATES UR QL: ABNORMAL
BENZODIAZ UR QL: ABNORMAL
CANNABINOIDS UR QL SCN: ABNORMAL
COCAINE UR QL: ABNORMAL
CREAT UR-MCNC: 98.2 MG/DL
METHADONE UR QL SCN: ABNORMAL
OPIATES UR QL SCN: ABNORMAL
OXYCODONE UR QL: ABNORMAL
PCP UR QL SCN: ABNORMAL

## 2018-05-23 ENCOUNTER — OFFICE VISIT - HEALTHEAST (OUTPATIENT)
Dept: ADDICTION MEDICINE | Facility: CLINIC | Age: 33
End: 2018-05-23

## 2018-05-23 DIAGNOSIS — F12.20 CANNABIS DEPENDENCE (H): ICD-10-CM

## 2018-05-24 ENCOUNTER — OFFICE VISIT - HEALTHEAST (OUTPATIENT)
Dept: ADDICTION MEDICINE | Facility: CLINIC | Age: 33
End: 2018-05-24

## 2018-05-24 ENCOUNTER — AMBULATORY - HEALTHEAST (OUTPATIENT)
Dept: LAB | Facility: CLINIC | Age: 33
End: 2018-05-24

## 2018-05-24 DIAGNOSIS — F12.20 CANNABIS DEPENDENCE (H): ICD-10-CM

## 2018-05-24 LAB
AMPHETAMINES UR QL SCN: ABNORMAL
BARBITURATES UR QL: ABNORMAL
BENZODIAZ UR QL: ABNORMAL
CANNABINOIDS UR QL SCN: ABNORMAL
COCAINE UR QL: ABNORMAL
CREAT UR-MCNC: 420.5 MG/DL
METHADONE UR QL SCN: ABNORMAL
OPIATES UR QL SCN: ABNORMAL
OXYCODONE UR QL: ABNORMAL
PCP UR QL SCN: ABNORMAL

## 2018-05-25 ENCOUNTER — AMBULATORY - HEALTHEAST (OUTPATIENT)
Dept: ADDICTION MEDICINE | Facility: CLINIC | Age: 33
End: 2018-05-25

## 2018-05-29 ENCOUNTER — AMBULATORY - HEALTHEAST (OUTPATIENT)
Dept: LAB | Facility: CLINIC | Age: 33
End: 2018-05-29

## 2018-05-29 ENCOUNTER — OFFICE VISIT - HEALTHEAST (OUTPATIENT)
Dept: ADDICTION MEDICINE | Facility: CLINIC | Age: 33
End: 2018-05-29

## 2018-05-29 DIAGNOSIS — F12.20 CANNABIS DEPENDENCE (H): ICD-10-CM

## 2018-05-29 LAB
AMPHETAMINES UR QL SCN: NORMAL
BARBITURATES UR QL: NORMAL
BENZODIAZ UR QL: NORMAL
CANNABINOIDS UR QL SCN: NORMAL
COCAINE UR QL: NORMAL
CREAT UR-MCNC: 142.6 MG/DL
METHADONE UR QL SCN: NORMAL
OPIATES UR QL SCN: NORMAL
OXYCODONE UR QL: NORMAL
PCP UR QL SCN: NORMAL

## 2018-05-30 ENCOUNTER — OFFICE VISIT - HEALTHEAST (OUTPATIENT)
Dept: ADDICTION MEDICINE | Facility: CLINIC | Age: 33
End: 2018-05-30

## 2018-05-30 DIAGNOSIS — F12.20 CANNABIS DEPENDENCE (H): ICD-10-CM

## 2018-06-01 ENCOUNTER — AMBULATORY - HEALTHEAST (OUTPATIENT)
Dept: ADDICTION MEDICINE | Facility: CLINIC | Age: 33
End: 2018-06-01

## 2018-06-05 ENCOUNTER — OFFICE VISIT - HEALTHEAST (OUTPATIENT)
Dept: ADDICTION MEDICINE | Facility: CLINIC | Age: 33
End: 2018-06-05

## 2018-06-05 ENCOUNTER — AMBULATORY - HEALTHEAST (OUTPATIENT)
Dept: LAB | Facility: CLINIC | Age: 33
End: 2018-06-05

## 2018-06-05 DIAGNOSIS — F12.20 CANNABIS DEPENDENCE (H): ICD-10-CM

## 2018-06-05 LAB
AMPHETAMINES UR QL SCN: NORMAL
BARBITURATES UR QL: NORMAL
BENZODIAZ UR QL: NORMAL
CANNABINOIDS UR QL SCN: NORMAL
COCAINE UR QL: NORMAL
CREAT UR-MCNC: 320.3 MG/DL
METHADONE UR QL SCN: NORMAL
OPIATES UR QL SCN: NORMAL
OXYCODONE UR QL: NORMAL
PCP UR QL SCN: NORMAL

## 2018-06-07 ENCOUNTER — OFFICE VISIT - HEALTHEAST (OUTPATIENT)
Dept: ADDICTION MEDICINE | Facility: CLINIC | Age: 33
End: 2018-06-07

## 2018-06-07 ENCOUNTER — AMBULATORY - HEALTHEAST (OUTPATIENT)
Dept: LAB | Facility: CLINIC | Age: 33
End: 2018-06-07

## 2018-06-07 DIAGNOSIS — F12.20 CANNABIS DEPENDENCE (H): ICD-10-CM

## 2018-06-07 LAB
AMPHETAMINES UR QL SCN: NORMAL
BARBITURATES UR QL: NORMAL
BENZODIAZ UR QL: NORMAL
CANNABINOIDS UR QL SCN: NORMAL
COCAINE UR QL: NORMAL
CREAT UR-MCNC: 122.9 MG/DL
METHADONE UR QL SCN: NORMAL
OPIATES UR QL SCN: NORMAL
OXYCODONE UR QL: NORMAL
PCP UR QL SCN: NORMAL

## 2018-06-08 ENCOUNTER — AMBULATORY - HEALTHEAST (OUTPATIENT)
Dept: ADDICTION MEDICINE | Facility: CLINIC | Age: 33
End: 2018-06-08

## 2018-06-12 ENCOUNTER — OFFICE VISIT - HEALTHEAST (OUTPATIENT)
Dept: ADDICTION MEDICINE | Facility: CLINIC | Age: 33
End: 2018-06-12

## 2018-06-12 ENCOUNTER — AMBULATORY - HEALTHEAST (OUTPATIENT)
Dept: LAB | Facility: CLINIC | Age: 33
End: 2018-06-12

## 2018-06-12 DIAGNOSIS — F12.20 CANNABIS DEPENDENCE (H): ICD-10-CM

## 2018-06-12 LAB
AMPHETAMINES UR QL SCN: NORMAL
BARBITURATES UR QL: NORMAL
BENZODIAZ UR QL: NORMAL
CANNABINOIDS UR QL SCN: NORMAL
COCAINE UR QL: NORMAL
CREAT UR-MCNC: 10.6 MG/DL
METHADONE UR QL SCN: NORMAL
OPIATES UR QL SCN: NORMAL
OXYCODONE UR QL: NORMAL
PCP UR QL SCN: NORMAL

## 2018-06-15 ENCOUNTER — AMBULATORY - HEALTHEAST (OUTPATIENT)
Dept: ADDICTION MEDICINE | Facility: CLINIC | Age: 33
End: 2018-06-15

## 2018-06-19 ENCOUNTER — AMBULATORY - HEALTHEAST (OUTPATIENT)
Dept: LAB | Facility: CLINIC | Age: 33
End: 2018-06-19

## 2018-06-19 ENCOUNTER — OFFICE VISIT - HEALTHEAST (OUTPATIENT)
Dept: ADDICTION MEDICINE | Facility: CLINIC | Age: 33
End: 2018-06-19

## 2018-06-19 DIAGNOSIS — F12.20 CANNABIS DEPENDENCE (H): ICD-10-CM

## 2018-06-19 LAB
AMPHETAMINES UR QL SCN: NORMAL
BARBITURATES UR QL: NORMAL
BENZODIAZ UR QL: NORMAL
CANNABINOIDS UR QL SCN: NORMAL
COCAINE UR QL: NORMAL
CREAT UR-MCNC: 291 MG/DL
METHADONE UR QL SCN: NORMAL
OPIATES UR QL SCN: NORMAL
OXYCODONE UR QL: NORMAL
PCP UR QL SCN: NORMAL

## 2018-06-21 ENCOUNTER — AMBULATORY - HEALTHEAST (OUTPATIENT)
Dept: LAB | Facility: CLINIC | Age: 33
End: 2018-06-21

## 2018-06-21 ENCOUNTER — OFFICE VISIT - HEALTHEAST (OUTPATIENT)
Dept: ADDICTION MEDICINE | Facility: CLINIC | Age: 33
End: 2018-06-21

## 2018-06-21 DIAGNOSIS — F12.20 CANNABIS DEPENDENCE (H): ICD-10-CM

## 2018-06-21 LAB
AMPHETAMINES UR QL SCN: NORMAL
BARBITURATES UR QL: NORMAL
BENZODIAZ UR QL: NORMAL
CANNABINOIDS UR QL SCN: NORMAL
COCAINE UR QL: NORMAL
CREAT UR-MCNC: 133.4 MG/DL
METHADONE UR QL SCN: NORMAL
OPIATES UR QL SCN: NORMAL
OXYCODONE UR QL: NORMAL
PCP UR QL SCN: NORMAL

## 2018-06-22 ENCOUNTER — AMBULATORY - HEALTHEAST (OUTPATIENT)
Dept: ADDICTION MEDICINE | Facility: CLINIC | Age: 33
End: 2018-06-22

## 2018-06-26 ENCOUNTER — AMBULATORY - HEALTHEAST (OUTPATIENT)
Dept: LAB | Facility: CLINIC | Age: 33
End: 2018-06-26

## 2018-06-26 ENCOUNTER — OFFICE VISIT - HEALTHEAST (OUTPATIENT)
Dept: ADDICTION MEDICINE | Facility: CLINIC | Age: 33
End: 2018-06-26

## 2018-06-26 DIAGNOSIS — F12.20 CANNABIS DEPENDENCE (H): ICD-10-CM

## 2018-06-26 LAB
AMPHETAMINES UR QL SCN: NORMAL
BARBITURATES UR QL: NORMAL
BENZODIAZ UR QL: NORMAL
CANNABINOIDS UR QL SCN: NORMAL
COCAINE UR QL: NORMAL
CREAT UR-MCNC: 214.6 MG/DL
METHADONE UR QL SCN: NORMAL
OPIATES UR QL SCN: NORMAL
OXYCODONE UR QL: NORMAL
PCP UR QL SCN: NORMAL

## 2018-06-28 ENCOUNTER — OFFICE VISIT - HEALTHEAST (OUTPATIENT)
Dept: ADDICTION MEDICINE | Facility: CLINIC | Age: 33
End: 2018-06-28

## 2018-06-28 ENCOUNTER — AMBULATORY - HEALTHEAST (OUTPATIENT)
Dept: LAB | Facility: CLINIC | Age: 33
End: 2018-06-28

## 2018-06-28 DIAGNOSIS — F12.20 CANNABIS DEPENDENCE (H): ICD-10-CM

## 2018-06-28 LAB
AMPHETAMINES UR QL SCN: NORMAL
BARBITURATES UR QL: NORMAL
BENZODIAZ UR QL: NORMAL
CANNABINOIDS UR QL SCN: NORMAL
COCAINE UR QL: NORMAL
CREAT UR-MCNC: 29.9 MG/DL
METHADONE UR QL SCN: NORMAL
OPIATES UR QL SCN: NORMAL
OXYCODONE UR QL: NORMAL
PCP UR QL SCN: NORMAL

## 2018-06-29 ENCOUNTER — AMBULATORY - HEALTHEAST (OUTPATIENT)
Dept: ADDICTION MEDICINE | Facility: CLINIC | Age: 33
End: 2018-06-29

## 2018-07-03 ENCOUNTER — OFFICE VISIT - HEALTHEAST (OUTPATIENT)
Dept: ADDICTION MEDICINE | Facility: CLINIC | Age: 33
End: 2018-07-03

## 2018-07-03 ENCOUNTER — AMBULATORY - HEALTHEAST (OUTPATIENT)
Dept: LAB | Facility: CLINIC | Age: 33
End: 2018-07-03

## 2018-07-03 DIAGNOSIS — F12.20 CANNABIS DEPENDENCE (H): ICD-10-CM

## 2018-07-03 LAB
AMPHETAMINES UR QL SCN: NORMAL
BARBITURATES UR QL: NORMAL
BENZODIAZ UR QL: NORMAL
CANNABINOIDS UR QL SCN: NORMAL
COCAINE UR QL: NORMAL
CREAT UR-MCNC: 270.8 MG/DL
METHADONE UR QL SCN: NORMAL
OPIATES UR QL SCN: NORMAL
OXYCODONE UR QL: NORMAL
PCP UR QL SCN: NORMAL

## 2018-07-05 ENCOUNTER — AMBULATORY - HEALTHEAST (OUTPATIENT)
Dept: LAB | Facility: CLINIC | Age: 33
End: 2018-07-05

## 2018-07-05 ENCOUNTER — OFFICE VISIT - HEALTHEAST (OUTPATIENT)
Dept: ADDICTION MEDICINE | Facility: CLINIC | Age: 33
End: 2018-07-05

## 2018-07-05 DIAGNOSIS — F12.20 CANNABIS DEPENDENCE (H): ICD-10-CM

## 2018-07-05 LAB
AMPHETAMINES UR QL SCN: NORMAL
BARBITURATES UR QL: NORMAL
BENZODIAZ UR QL: NORMAL
CANNABINOIDS UR QL SCN: NORMAL
COCAINE UR QL: NORMAL
CREAT UR-MCNC: 281.9 MG/DL
METHADONE UR QL SCN: NORMAL
OPIATES UR QL SCN: NORMAL
OXYCODONE UR QL: NORMAL
PCP UR QL SCN: NORMAL

## 2018-07-06 ENCOUNTER — AMBULATORY - HEALTHEAST (OUTPATIENT)
Dept: ADDICTION MEDICINE | Facility: CLINIC | Age: 33
End: 2018-07-06

## 2018-07-12 ENCOUNTER — AMBULATORY - HEALTHEAST (OUTPATIENT)
Dept: LAB | Facility: CLINIC | Age: 33
End: 2018-07-12

## 2018-07-12 ENCOUNTER — AMBULATORY - HEALTHEAST (OUTPATIENT)
Dept: ADDICTION MEDICINE | Facility: CLINIC | Age: 33
End: 2018-07-12

## 2018-07-12 ENCOUNTER — OFFICE VISIT - HEALTHEAST (OUTPATIENT)
Dept: ADDICTION MEDICINE | Facility: CLINIC | Age: 33
End: 2018-07-12

## 2018-07-12 DIAGNOSIS — F12.20 CANNABIS DEPENDENCE (H): ICD-10-CM

## 2018-07-12 LAB
AMPHETAMINES UR QL SCN: NORMAL
BARBITURATES UR QL: NORMAL
BENZODIAZ UR QL: NORMAL
CANNABINOIDS UR QL SCN: NORMAL
COCAINE UR QL: NORMAL
CREAT UR-MCNC: 91.7 MG/DL
METHADONE UR QL SCN: NORMAL
OPIATES UR QL SCN: NORMAL
OXYCODONE UR QL: NORMAL
PCP UR QL SCN: NORMAL

## 2018-07-13 ENCOUNTER — AMBULATORY - HEALTHEAST (OUTPATIENT)
Dept: ADDICTION MEDICINE | Facility: CLINIC | Age: 33
End: 2018-07-13

## 2018-07-17 ENCOUNTER — AMBULATORY - HEALTHEAST (OUTPATIENT)
Dept: ADDICTION MEDICINE | Facility: CLINIC | Age: 33
End: 2018-07-17

## 2018-07-20 ENCOUNTER — AMBULATORY - HEALTHEAST (OUTPATIENT)
Dept: ADDICTION MEDICINE | Facility: CLINIC | Age: 33
End: 2018-07-20

## 2018-07-24 ENCOUNTER — OFFICE VISIT - HEALTHEAST (OUTPATIENT)
Dept: ADDICTION MEDICINE | Facility: CLINIC | Age: 33
End: 2018-07-24

## 2018-07-24 ENCOUNTER — AMBULATORY - HEALTHEAST (OUTPATIENT)
Dept: LAB | Facility: CLINIC | Age: 33
End: 2018-07-24

## 2018-07-24 DIAGNOSIS — F12.20 CANNABIS DEPENDENCE (H): ICD-10-CM

## 2018-07-24 LAB
AMPHETAMINES UR QL SCN: NORMAL
BARBITURATES UR QL: NORMAL
BENZODIAZ UR QL: NORMAL
CANNABINOIDS UR QL SCN: NORMAL
COCAINE UR QL: NORMAL
CREAT UR-MCNC: 562.1 MG/DL
METHADONE UR QL SCN: NORMAL
OPIATES UR QL SCN: NORMAL
OXYCODONE UR QL: NORMAL
PCP UR QL SCN: NORMAL

## 2018-07-25 ENCOUNTER — AMBULATORY - HEALTHEAST (OUTPATIENT)
Dept: ADDICTION MEDICINE | Facility: CLINIC | Age: 33
End: 2018-07-25

## 2018-07-26 ENCOUNTER — AMBULATORY - HEALTHEAST (OUTPATIENT)
Dept: ADDICTION MEDICINE | Facility: CLINIC | Age: 33
End: 2018-07-26

## 2018-07-27 ENCOUNTER — AMBULATORY - HEALTHEAST (OUTPATIENT)
Dept: ADDICTION MEDICINE | Facility: CLINIC | Age: 33
End: 2018-07-27

## 2018-07-31 ENCOUNTER — AMBULATORY - HEALTHEAST (OUTPATIENT)
Dept: INTERNAL MEDICINE | Facility: CLINIC | Age: 33
End: 2018-07-31

## 2018-07-31 ENCOUNTER — OFFICE VISIT - HEALTHEAST (OUTPATIENT)
Dept: ADDICTION MEDICINE | Facility: CLINIC | Age: 33
End: 2018-07-31

## 2018-07-31 DIAGNOSIS — F12.20 CANNABIS DEPENDENCE (H): ICD-10-CM

## 2018-08-02 ENCOUNTER — AMBULATORY - HEALTHEAST (OUTPATIENT)
Dept: ADDICTION MEDICINE | Facility: CLINIC | Age: 33
End: 2018-08-02

## 2018-08-03 ENCOUNTER — AMBULATORY - HEALTHEAST (OUTPATIENT)
Dept: ADDICTION MEDICINE | Facility: CLINIC | Age: 33
End: 2018-08-03

## 2018-08-07 ENCOUNTER — OFFICE VISIT - HEALTHEAST (OUTPATIENT)
Dept: ADDICTION MEDICINE | Facility: CLINIC | Age: 33
End: 2018-08-07

## 2018-08-07 ENCOUNTER — AMBULATORY - HEALTHEAST (OUTPATIENT)
Dept: LAB | Facility: CLINIC | Age: 33
End: 2018-08-07

## 2018-08-07 DIAGNOSIS — F12.20 CANNABIS DEPENDENCE (H): ICD-10-CM

## 2018-08-07 LAB
AMPHETAMINES UR QL SCN: NORMAL
BARBITURATES UR QL: NORMAL
BENZODIAZ UR QL: NORMAL
CANNABINOIDS UR QL SCN: NORMAL
COCAINE UR QL: NORMAL
CREAT UR-MCNC: 202.8 MG/DL
METHADONE UR QL SCN: NORMAL
OPIATES UR QL SCN: NORMAL
OXYCODONE UR QL: NORMAL
PCP UR QL SCN: NORMAL

## 2018-08-14 ENCOUNTER — AMBULATORY - HEALTHEAST (OUTPATIENT)
Dept: ADDICTION MEDICINE | Facility: CLINIC | Age: 33
End: 2018-08-14

## 2018-08-16 ENCOUNTER — OFFICE VISIT - HEALTHEAST (OUTPATIENT)
Dept: ADDICTION MEDICINE | Facility: CLINIC | Age: 33
End: 2018-08-16

## 2018-08-16 DIAGNOSIS — F12.20 CANNABIS DEPENDENCE (H): ICD-10-CM

## 2018-08-19 ENCOUNTER — AMBULATORY - HEALTHEAST (OUTPATIENT)
Dept: ADDICTION MEDICINE | Facility: CLINIC | Age: 33
End: 2018-08-19

## 2018-08-22 ENCOUNTER — AMBULATORY - HEALTHEAST (OUTPATIENT)
Dept: ADDICTION MEDICINE | Facility: CLINIC | Age: 33
End: 2018-08-22

## 2018-08-23 ENCOUNTER — AMBULATORY - HEALTHEAST (OUTPATIENT)
Dept: ADDICTION MEDICINE | Facility: CLINIC | Age: 33
End: 2018-08-23

## 2018-08-23 ENCOUNTER — OFFICE VISIT - HEALTHEAST (OUTPATIENT)
Dept: ADDICTION MEDICINE | Facility: CLINIC | Age: 33
End: 2018-08-23

## 2018-08-23 ENCOUNTER — AMBULATORY - HEALTHEAST (OUTPATIENT)
Dept: BEHAVIORAL HEALTH | Facility: CLINIC | Age: 33
End: 2018-08-23

## 2018-08-23 DIAGNOSIS — F12.20 CANNABIS DEPENDENCE (H): ICD-10-CM

## 2018-08-24 ENCOUNTER — AMBULATORY - HEALTHEAST (OUTPATIENT)
Dept: ADDICTION MEDICINE | Facility: CLINIC | Age: 33
End: 2018-08-24

## 2018-08-28 ENCOUNTER — AMBULATORY - HEALTHEAST (OUTPATIENT)
Dept: ADDICTION MEDICINE | Facility: CLINIC | Age: 33
End: 2018-08-28

## 2018-08-29 ENCOUNTER — AMBULATORY - HEALTHEAST (OUTPATIENT)
Dept: ADDICTION MEDICINE | Facility: CLINIC | Age: 33
End: 2018-08-29

## 2018-08-30 ENCOUNTER — AMBULATORY - HEALTHEAST (OUTPATIENT)
Dept: LAB | Facility: CLINIC | Age: 33
End: 2018-08-30

## 2018-08-30 ENCOUNTER — OFFICE VISIT - HEALTHEAST (OUTPATIENT)
Dept: ADDICTION MEDICINE | Facility: CLINIC | Age: 33
End: 2018-08-30

## 2018-08-30 DIAGNOSIS — F12.20 CANNABIS DEPENDENCE (H): ICD-10-CM

## 2018-08-30 LAB
AMPHETAMINES UR QL SCN: NORMAL
BARBITURATES UR QL: NORMAL
BENZODIAZ UR QL: NORMAL
CANNABINOIDS UR QL SCN: NORMAL
COCAINE UR QL: NORMAL
CREAT UR-MCNC: 375.9 MG/DL
METHADONE UR QL SCN: NORMAL
OPIATES UR QL SCN: NORMAL
OXYCODONE UR QL: NORMAL
PCP UR QL SCN: NORMAL

## 2018-08-31 ENCOUNTER — AMBULATORY - HEALTHEAST (OUTPATIENT)
Dept: ADDICTION MEDICINE | Facility: CLINIC | Age: 33
End: 2018-08-31

## 2018-09-04 ENCOUNTER — OFFICE VISIT - HEALTHEAST (OUTPATIENT)
Dept: ADDICTION MEDICINE | Facility: CLINIC | Age: 33
End: 2018-09-04

## 2018-09-04 DIAGNOSIS — F12.20 CANNABIS DEPENDENCE (H): ICD-10-CM

## 2018-09-06 ENCOUNTER — OFFICE VISIT - HEALTHEAST (OUTPATIENT)
Dept: ADDICTION MEDICINE | Facility: CLINIC | Age: 33
End: 2018-09-06

## 2018-09-06 DIAGNOSIS — F12.20 CANNABIS DEPENDENCE (H): ICD-10-CM

## 2018-09-10 ENCOUNTER — AMBULATORY - HEALTHEAST (OUTPATIENT)
Dept: ADDICTION MEDICINE | Facility: CLINIC | Age: 33
End: 2018-09-10

## 2021-06-01 VITALS — BODY MASS INDEX: 26.22 KG/M2 | HEIGHT: 69 IN | WEIGHT: 177 LBS

## 2021-06-12 NOTE — PROGRESS NOTES
[facility]   Mental Health and Addiction Care   [facility], [facility], and [facility] [employer]    [phone] or [phone]   Master Plan  Client Name:  Tyler Ramsey  MRN: [id number]   Counselor: REG Ernandez    Title:Dimension #1 - Withdrawal Potential - Risk 0, no concern.    Plan Date:   8/14/2017  Diagnosis:   Cannabis Use Disorder, Moderate  Problem:Client reports no WD symptoms currently and none when he has had periods of sobriety in the past. Reports last date of use (marijuana) was 8/5/2017.    Goal: Begin Date: 8/14/17 Target Date: 11/14/17  Maintain abstinence throughout  Outpatient Treatment in order to avoid experiencing withdrawal symptoms and to meet OP expectations.   Method 1: Begin Date:8/14/17 Target Date: 11/14/17 Date Completed:   Attend OP groups as directed and share thoughts, feelings and urges to use, as well as sober supports with staff and peers in order to maintain awareness of details shaping your recovery process.  Method 2: Begin Date:8/14/17 Target Date: 11/14/17 Date Completed:   Comply with staff requests for breathalyzer and UA's.       Title:Dimension #2 - Biomedical Conditions - Risk 0, No concern.   Plan Date:   8/14/2017  Diagnosis:   Cannabis Use Disorder, Moderate  Problem:Client reports he doesn't have a PCP although he did see a doctor a few months ago.  He reports no current medical conditions, no hx of chronic conditions.    Goal: Begin Date: 8/14/17 Target Date: 11/14/17  Practice living a healthy lifestyle on a daily basis with proper rest, nutrition and exercise.   Method 1: Begin Date:8/14/17 Target Date: 11/14/17 Date Completed:   Continue to follow recommendations from your personal care provider regarding medical issues. Inform staff immediately of any changes in your health that may affect your active participation in group therapy or attendance.  Is Nicotine dependence indicated on the assessment? YES  Method 2: Begin Date:8/14/17 Target Date:  "11/14/17 Date Completed:  Staff will provide client with information on tobacco cessation,and tools for quitting.       Title:Dimension #3 - Emotional/Behavioral/Cognitive - Risk 1, mild concern.    Plan Date:   8/14/2017  Diagnosis:   Cannabis Use Disorder, Moderate  Problem:Client reports no current MH symptoms, dx, services or meds.  He reports that in the past (as a teenager) he did see a psychiatrist and his dx were \"nerve problems\" and a learning disability.  No meds at that time.  He reports having difficulty reading and completing reading/writing tasks.     Goal: Begin Date: 8/14/17 Target Date: 11/14/17   To treat mental health effectively while attending treatment in order to increase your ability to meet goals and treatment expectations.   Method 1: Begin Date:8/14/17 Target Date: 11/14/17 Date Completed:   Begin to journal on a daily basis recording feelings and event of the day. Reflecting on this daily. Report in group how this is beneficial.  Method 2: Begin Date:8/14/17 Target Date: 11/14/17 Date Completed:   Identify 3 personal traits you like about yourself and 3 that you would like to see changes in. Develop a plan to initiate those changes. What will need to happen for these goals to be successful? Share this with counselor.       Title:Dimension #4 - Treatment Acceptance/Resistance - Risk 2, moderate concern.   Plan Date:   8/14/2017  Diagnosis:   Cannabis Use Disorder, Moderate  Problem:Client reports that he is mandated to complete tx because of his continued use while on probation.  However, he states that this is a good thing and he is glad to be coming to treatment.      Goal: Begin Date: 8/14/17 Target Date: 11/14/17  To follow through with intentions to treat chemical dependency concerns while meeting OP treatment expectations in order to graduate successfully from our program.   Method 1: Begin Date:8/14/17 Target Date: 9/18/17 Date Completed:   Complete 1st Step (Use History and " "Consequences) assignment while in Phase I of treatment and present to peers in group. Reflect on feedback received from peers and report findings to staff.   Method 2: Begin Date:8/14/17 Target Date: 11/10/17  Date Completed:   Complete all written assignments as assigned by staff including your \"goodbye letter\" and \"relapse prevention plan\" prior to graduation.  Contact staff with questions or concerns about written and oral assignments while attending group therapy.  Method 3: Begin Date:8/14/17 Target Date: 11/14/17 Date Completed:   If for any reason you cannot attend group therapy or you will be tardy, contact staff as soon as possible at [phone](S) or [phone](T). Three unexcused absences  is considered voluntary discharge from the outpatient program.      Title:Dimension #5 - Relapse Potential - Risk 3, serious concern.    Plan Date:   8/14/2017  Diagnosis:   Cannabis Use Disorder, Moderate  Problem:Client has hx of several previous treatment experiences and has resumed use afterwards.      Goal: Begin Date: 8/14/17Target Date: 11/14/17  To deal effectively with relapse triggers and stressors while building coping skills in order to handle life events without resorting to drug/alcohol use.   Method 1: Begin Date:8/14/17 Target Date: 11/14/17 Date Completed:   Participate in OP group check-ins consistently as way of increasing self-awareness and connecting honestly with staff and peers.   Method 2: Begin Date:8/14/17 Target Date: 11/14/17 Date Completed:   Develop a list of 7 triggers to your use and identify 10 coping skills you can use to arrest the urge to use. Submit to counselor when finished.    Method 3: Begin Date:8/14/17 Target Date: 11/14/17Date Completed:   Report any relapses, if any, on any substances of abuse to staff immediately.       Title:Dimension #6 - Recovery Environment - Risk 3, serious concern.      Plan Date:   8/14/2017  Diagnosis:   Cannabis Use Disorder, " Moderate  Problem:Client reports living by himself in an apartment which he likes and that in his building there are AA meetings on site.  He has not attended any, but is open to thinking about it.  Client is not employed, his days lack structure and routine.  Client reports many deaths among his family and friends over the last several years and that these have been triggers for resumed or increased use.  He likes to work out and go out to eat; also likes to play video games although that activity is associated with using.  Current [facility] probation for 2nd degree assault, PO Shayla Wave.     Goal: Begin Date: 8/14/17 Target Date: 11/14/17  To build meaningful structure into your weekly schedule by attending specific recovery activities on a daily basis   Method 1: Begin Date:8/14/17 Target Date: 11/14/17 Date Completed:   Find 2 sober support groups you feel comfortable attending on a weekly basis and inform counselor how these meetings are impacting you.Obtain a sponsor before 2nd phase of treatment.    Method 2: Begin Date:8/14/17 Target Date: 11/14/17  Date Completed:   Develop a Bucket list. What activities would you like to take part in. Set goals in intervals.  such as 3 month, 6 month,1 year, 3 years and 5 years, what barriers do you identify at this time for these goals to happen.     By signing this document, I am acknowledging that I was actively and directly involved in the development of my treatment plan.  I have been a participant in the creation of my individual treatment plan.       Client Signature_________________________________________         Date__________________         Staff Signature Radha Gaming [facility] 8/14/2017

## 2021-06-12 NOTE — PROGRESS NOTES
Addiction Services - Initial Services Plan      Name:  Tyler Ramsey       :  1985        MRN:  [id number]    Goal Methods   1.  Acceptance of chemical dependency and mental illness as a disease. A.  Comply with all med/psych recommendations  B.  Complete preliminary interviews  C.  Attend all program functions  D.  Attend all individual counseling sessions  E.  Read all assigned literature  F.  Complete psychological testing as assigned  G.  Particpate in any necessary consultations     2.  Acceptance of my need and ability to change A.  Complete written workbook assignments on MICD  B.  Participate in conferences (P.O., , family)  C.  Participate in 12 Step/support groups if desired  D.  Actively participate in treatment planning and reviews  E.  Complete all assignments given or recommended on Treatment Plan  F.  Present Drug History/Peer Assessment with peer group  G.  Complete 10th Step Inventory daily   3.  Acceptance of staff recommendations as a means to my recovery A.  Participate in all interviews for Continuum of Care Plans  B.  Familiarize self with 12 Step Recovery Program and how this applies to your day-to-day behavior  C.  Demonstrate a working knowledge of AA steps of recovery  D.  Obtain a sponsor if desired     Patient describes their immediate need:  Complete tx for probation  Are there any immediate Safety Needs such as (physical, stability, mobility):  None    Immediate Health Needs and Plan:    Client has difficulty with reading and writing tasks    Vulnerable Adult:  No    [] Continue Current Medications for: None  [] Request Consult for:  [] Notify Attending Physician about:  [] Other:      Issues to be addressed in the first sessions:    Become acquainted with group norms and other group members.   Set up time to meet 1:1 with primary counselor.     Patient strengths and needs:   Strengths: I 'm wise and like to find out things,  I like people, like to conversate  sometimes - sometimes kind of quiet.    Needs: Need to work on staying clean and staying away from it.    Plan for patient for time between intake and completion of the treatment plan:  Remain abstinent from any illicit substance/alcohol use, and start group on 8/9/2017.  Participate in all treatment activities and assignments.     Staff Members' Titles authorized to Initiate Services are:    Director of Behavioral Service    Clinical Director of Chemical Dependency    Primary Counselor    MI/SERGEY Sr. Counselor        Nursing Staff    Vulnerable Adult Review  [] Review of the facility Abuse Prevention plan was reviewed with the patient  [x] No individual abuse plan is necessary  [] In addition to the facility Abuse Prevention plan, an Individual Abuse Plan will be put in place    I understand these goals to be the Treatment Goals of the Program, and I agree to the stated Methods in attempting to accomplish these goals.    Patient Signature:  _________________________Date:  8/8/2017      Staff Name/Title:  REG Cooley    Date:  8/8/2017  Time: 11:12 AM

## 2021-06-12 NOTE — PROGRESS NOTES
Weekly Progress Note  Tyler Ramsey  1985  [id number]      D) Pt attended 3 groups  this week with 1 absences. The patient is in phase I of DOP. A) Staff facilitated groups and reviewed tx progress. Assessed for VA. R) No VAP needed at this time. Pt working on the following dimensions:  Dimension #1 - Withdrawal Potential - Risk 0, no concern.   Client reports no withdrawal symptoms he reports his date of last use to be 8/20/17 in which he used marijuana. The patient is to maintain abstinence (TXPlan 1). The patient reports no use in the last week nor does he endorse any withdrawal symptoms.   Dimension #2 - Biomedical Conditions - Risk 0, No concern. Client reports he doesn't have a PCP although he did see a doctor a few months ago. Client reports he can seek care when needed despite not having a PCP or clinic to use for routine concerns.The patient is to practice living a healthy lifestyle by getting proper rest, nutrition, and exercise (TXPlan2). No emergent biomedical concerns at this time. The patient report wanting to seek dental attention, he does have MA however he needs to call and request a new medical card. The patient will be given assistance in getting dental attention.   Dimension #3 - Emotional/Behavioral/Cognitive - Risk 1, mild concern.  Client reports no current MH symptoms, dx, services. The patient does have a learning disability he reports having difficulty reading and completing reading/writing tasks. The patient reports no thoughts of harming self or others at this time. No concerns.    Dimension #4 - Treatment Acceptance/Resistance - Risk 1, mild concern.  Client reports that he is mandated to complete tx because of his continued use while on probation.  However, he states that this is a good thing and he is glad to be coming to treatment.  Client has continued to use while on probation despite mandates and displays ambivalence about maintaining long-term sobriety. The patient has been  attending and participating in the group setting. The patient and this writer did have a 1x1 this week to go over the patients assignments, we will have a rolling schedule for 1x1 sessions every Wednesday at 8:30am. The purpose of these sessions is for this writer to read the assignment questions and then discuss as the patient struggles with literacy(TXPlan 4). The patient did have an absence on 8/31/17 in which he was a NCNS, at this time this writer has not had any contact with the patient.   Dimension #5 - Relapse Potential - Risk 3, serious concern.   Client has hx of several previous treatment experiences and has resumed use afterwards.  He has some knowledge of coping strategies to prevent use but displays little knowledge of how to prevent relapse in the long term and how to arrest use if relapse occurs. The patient reports date of last use to be 8/20/17 in which he used marijuana. The patient did identify old using friends to be triggers this week, he reported that some of his friends called him and asked if he wanted to go out. He was able to tell them no and stayed home. The patient and this writer discussed attending sober support meetings.   Dimension #6 - Recovery Environment - Risk 3, serious concern.    Client reports living by himself in an apartment which he likes and that in his building there are AA meetings on site. The patient did report attending his 1st AA meeting and reports that it went well, he plans to go back this weekend. Client is not employed, at this time he is working on building structure and routine into his daily life. He reports that his mother, sister and lady friends support him in general, including his sobriety.  Client reports many deaths among his family and friends over the last several years and that these have been triggers for resumed or increased use. Current [facility] probation for 2nd degree assault, PO Shayla Craft. No concerns at this time, the patient is  "maintaining contact with PO.    T) Patient educated on mental health. Patient has completed 35 of 84 program hours at this time. Projected discharge date is 11/8/17. Current discharge plan is relapse prevention.     REG Ernandez        Psycho-Educational Curriculum  Date Attended  Psycho-Educational Curriculum  Date Attended    Acceptance   Shame/Guilt     1st Step   Anger/Rage     Affirmations   Mental Health  8/28     Mental health research project 8/29     Victim Mentality  8/30   Automatic Negative Thoughts   Anxiety     Cross Addiction   Co-Occurring Disorders     Stages of Change   Carolyn/Bipolar     Relapse   Trauma      Addictive Thoughts   Victim Identity     Coping Skills   Sober Structure     Relapse Prevention   Continuum of Care     Medical Aspects   Non-12 Step Support     Brain/Neurotransmitters   Priorities     Medication Compliance   Spirituality     ADELE Alcohol/Drug Research   Weekend Planner     Physical Health   Educational Videos     Post Acute Withdrawal   1st Step     Pregnancy and Drug Use   2nd Step     Sexual Health   Assertive Communication     Short-Term/Long-Term Effects   My name is Jun GARCIA    Relationships   Cross Addiction     Love languages  8/17     Listening Skills  8/14     Assertive Communication   God As We Understood Him     Boundaries & Happiness  8/16 HBO Relapse     Codependence    HBO What Is Addiction     Defense Mechanisms    Medical Aspects 1     Family Roles & Genograms  8/15 Medical Aspects 2     Goodbye Letter   [employer] Geographic: Stress     Intimacy   PBS Depression Out of the Shadows     Needs/Dealbreakers in Relationships   The Anonymous People    Socialization Skills   [place]     Feelings  8/21 Julian Jean \"Highjacked Brain\"    Vulnerability  8/22     Emotional Regulation & Self Acceptance  8/23     Emma  8/24     ABC Model of Emotion   Rambo Stone Humor in Tx    Grief and Loss   The Mindfulness Movie    Healthy vs. Unhealthy Feelings   Jun GARCIA " documentary     Meditation/Mindfulness  Weekly      Overconfidence/Complacency       Resentments       Stress

## 2021-06-12 NOTE — PROGRESS NOTES
"D) Tyler is a 31 y.o. y.o. Black or  male who is [facility] to [facility] from [facility].  Patient orientated x3.  Currently meets criteria for Cannabis Use Disorder-Moderate (F12.20).  Patient appears appropriate for DOP at this time.    A) Completed updated intake assessment; preliminary paperwork; presented DAANES, ROIs, grievance procedure, Vulnerable Adult (VA) policy, TB & HIV/AIDS info and resources, confidentiality & HIPAA policies, Facility Abuse Prevention Plan, group rules/expectations, Patient Bill of Rights, counselor & supervisor license number and contact info, and PANSI.  Patient given statement of patient rights & responsibilities.  Conducted VA Assessment.    R) No special VA plan needed at this time.  PANSI score:  Low Risk.  Patient denied suicidal ideation/intent/plan/means at this time.  Patient signed and agreed to ROIs, VA Policy, confidentiality & HIPAA polices, group rules/expectations, and PANSI.    Dimension #1 - Withdrawal Potential - Risk 0, no concern.    Client reports no WD symptoms currently and none when he has had periods of sobriety in the past.  None observed. Reports last date of use (marijuana) was 8/5/2017.     Dimension #2 - Biomedical Conditions - Risk 1, mild concern.   Client reports he doesn't have a PCP although he did see a doctor a few months ago.  He reports no current medical conditions, no hx of chronic conditions, NKA.  Client reports he can seek care when needed despite not having a PCP or clinic to use for routine concerns.     Dimension #3 - Emotional/Behavioral/Cognitive - Risk 1, mild concern.    Client reports no current MH symptoms, dx, services or meds.  He reports that in the past (as a teenager) he did see a psychiatrist and his dx were \"nerve problems\" and a learning disability.  No meds at that time.  He reports having difficulty reading and completing reading/writing tasks. He reports no hx of " abuse, no SI/HI, past or present.  Client scored Low Risk on the PANSI screen.    Dimension #4 - Treatment Acceptance/Resistance - Risk 2, moderate concern.   Client reports that he is mandated to complete tx because of his continued use while on probation.  However, he states that this is a good thing and he is glad to be coming to treatment.  Client has continued to use while on probation despite mandates and displays ambivalence about maintaining long-term sobriety.    Dimension #5 - Relapse Potential - Risk 3, serious concern.    Client has hx of several previous treatment experiences and has resumed use afterwards.  He has some knowledge of coping strategies to prevent use but displays little knowledge of how to prevent relapse in the long term and how to arrest use if relapse occurs.  .     Dimension #6 - Recovery Environment - Risk 3, serious concern.    Client reports living by himself in an apartment which he likes and that in his building there are AA meetings on site.  He has not attended any, but is open to thinking about it.  Client is not employed, his days lack structure and routine. He reports that his mother, sister and lady friends support him in general, including his sobriety.  Client reports many deaths among his family and friends over the last several years and that these have been triggers for resumed or increased use.  He likes to work out and go out to eat; also likes to play video games although that activity is associated with using.  Current [facility] probation for 2nd degree assault, PO Shayla Craft.  Previous possession charges.  Client's phone buzzed very frequently during his intake appt - when asked about this he stated that it was his mom and other ladies checking up on him..      T) Explained Moraima LEMUS grievance procedure, VA policy, TB & HIV/AIDS info and resources, confidentiality & HIPAA policies, Facility Abuse Prevention Plan, group rules/expectations, Patient Bill of  Rights, counselor & supervisor license number and contact info, PANSI.    Patient agreed to start attending group sessions on 8/9/2017.      REG Rowe  8/8/2017, 4:27 PM

## 2021-06-12 NOTE — PROGRESS NOTES
Weekly Progress Note  Tyler Ramsey  1985  [id number]      D) Pt attended 4 groups  this week with 0 absences. The patient is in phase I of DOP. A) Staff facilitated groups and reviewed tx progress. [facility] for [facility]. R) No VAP needed at this time. Pt working on the following dimensions:  Dimension #1 - Withdrawal Potential - Risk 0, no concern.   Client reports no withdrawal symptoms he reports his date of last use to be 8/18/17 in which he used marijuana. The patient is to maintain abstinence (TXPlan 1). The patient reports no use in the last 7 days and denies any withdrawal symptoms at this time.   Dimension #2 - Biomedical Conditions - Risk 0, No concern. Client reports he doesn't have a PCP although he did see a doctor a few months ago. Client reports he can seek care when needed despite not having a PCP or clinic to use for routine concerns.The patient is to practice living a healthy lifestyle by getting proper rest, nutrition, and exercise (TXPlan2). The patient reports no emergent biomedical concerns at this time.   Dimension #3 - Emotional/Behavioral/Cognitive - Risk 1, mild concern.  Client reports no current MH symptoms, dx, services. The patient does have a learning disability he reports having difficulty reading and completing reading/writing tasks. The patient reports no thoughts of harming self or others at this time. The patient reports no emotional, behavioral, or cognitive concerns that need immediate attention.   Dimension #4 - Treatment Acceptance/Resistance - Risk 1, mild concern.  Client reports that he is mandated to complete tx because of his continued use while on probation.  However, he states that this is a good thing and he is glad to be coming to treatment.  Client has continued to use while on probation despite mandates and displays ambivalence about maintaining long-term sobriety. The patient has been attending and participating in the group setting. The patient and this writer did  have a 1x1 this week to go over the patients assignments, we will have a rolling schedule for 1x1 sessions every Wednesday at 8:30am. The purpose of these sessions is for this writer to read the assignment questions and then discuss as the patient struggles with literacy(TXPlan 4).   Dimension #5 - Relapse Potential - Risk 3, serious concern.   Client has hx of several previous treatment experiences and has resumed use afterwards.  He has some knowledge of coping strategies to prevent use but displays little knowledge of how to prevent relapse in the long term and how to arrest use if relapse occurs. The patient reports date of last use to be 8/18/17 in which he used marijuana. The patient has been able to identify triggers to be old family videos and thoughts about grief and loss. The patient reports that being alone is also a trigger to use, over the last week he reports that he was able to go for walks and look at the pros and cons of use.   Dimension #6 - Recovery Environment - Risk 3, serious concern.    Client reports living by himself in an apartment which he likes and that in his building there are AA meetings on site. The patient did report attending his 1st AA meeting and reports that it went well, he plans to go back this weekend. Client is not employed, at this time he is working on building structure and routine into his daily life. He reports that his mother, sister and lady friends support him in general, including his sobriety.  Client reports many deaths among his family and friends over the last several years and that these have been triggers for resumed or increased use. Current [facility] probation for 2nd degree assault, PO Shayla Craft.    T) Patient educated on feelings. Patient has completed 25 of 84 program hours at this time. Projected discharge date is 11/8/17. Current discharge plan is relapse prevention.     REG Ernandez        Psycho-Educational Curriculum  Date Attended   "Psycho-Educational Curriculum  Date Attended    Acceptance   Shame/Guilt     1st Step   Anger/Rage     Affirmations   Mental Health     Automatic Negative Thoughts   Anxiety     Cross Addiction   Co-Occurring Disorders     Stages of Change   Carolyn/Bipolar     Relapse   Trauma      Addictive Thoughts   Victim Identity     Coping Skills   Sober Structure     Relapse Prevention   Continuum of Care     Medical Aspects   Non-12 Step Support     Brain/Neurotransmitters   Priorities     Medication Compliance   Spirituality     ADELE Alcohol/Drug Research   Weekend Planner     Physical Health   Educational Videos     Post Acute Withdrawal   1st Step     Pregnancy and Drug Use   2nd Step     Sexual Health   Assertive Communication     Short-Term/Long-Term Effects   My name is Jun GARCIA    Relationships   Cross Addiction     Love languages  8/17     Listening Skills  8/14     Assertive Communication   God As We Understood Him     Boundaries & Happiness  8/16 HBO Relapse     Codependence    HBO What Is Addiction     Defense Mechanisms    Medical Aspects 1     Family Roles & Genograms  8/15 Medical Aspects 2     Goodbye Letter   National Geographic: Stress     Intimacy   PBS Depression Out of the Shadows     Needs/Dealbreakers in Relationships   The Anonymous People    Socialization Skills   [place]     Feelings  8/21 Julian Jean \"Highjacked Brain\"    Vulnerability  8/22     Emotional Regulation & Self Acceptance  8/23     Emma  8/24     ABC Model of Emotion   Rambo Stone Humor in Tx    Grief and Loss   The Mindfulness Movie    Healthy vs. Unhealthy Feelings   Jun GARCIA documentary     Meditation/Mindfulness  Weekly      Overconfidence/Complacency       Resentments       Stress         "

## 2021-06-12 NOTE — PROGRESS NOTES
1x1 Session:     The patient and this writer met for a 50 minute 1x1 session to begin the patients treatment plan goal on dimension 4 method 2. The patient struggles with literacy so the patient and this writer set up a 1x1 schedule every Wednesday at 8:30 to work on his assignment packet. The patient and this writer began the Personal Recovery Planning section of the assignment packet by this writer reading the questions to the patient and he answers. The patient and this writer discussed the questions and the patient decided that he would like help in setting up goals for short term, intermittent, and long term success. The patient reported that the session was helpful as he was better able to comprehend the questions that were being asked as well as discuss them with another person. Like stated above the patient and this writer will be meeting weekly to successfully complete assignments.    REG Ernandez 8/23/2017

## 2021-06-12 NOTE — PROGRESS NOTES
Weekly Progress Note  Tyler Ramsey  1985  [id number]      D) Pt attended 3 groups  this week with 0 absences. The patient is in phase I of DOP. A) Staff facilitated groups and reviewed tx progress. Assessed for [facility]. R) No VAP needed at this time. Pt working on the following dimensions:  Dimension #1 - Withdrawal Potential - Risk 0, no concern.   Client reports no withdrawal symptoms he reports his date of last use to be 8/20/17 in which he used marijuana. The patient is to maintain abstinence (TXPlan 1). The patient does not report any use within the last 7 days. The patient is not endorsing withdrawal symptoms at this time.   Dimension #2 - Biomedical Conditions - Risk 0, No concern. Client reports he doesn't have a PCP although he did see a doctor a few months ago. Client reports he can seek care when needed despite not having a PCP or clinic to use for routine concerns.The patient is to practice living a healthy lifestyle by getting proper rest, nutrition, and exercise (TXPlan2). The patient reports he will call for his insurance card and would like assistance in getting referrals for dental care.   Dimension #3 - Emotional/Behavioral/Cognitive - Risk 1, mild concern.  Client reports no current MH symptoms, dx, services. The patient does have a learning disability he reports having difficulty reading and completing reading/writing tasks. Patient does not endorse thoughts of harming self or others. At this time there are no emergent emotional, behavioral concerns.   Dimension #4 - Treatment Acceptance/Resistance - Risk 1, mild concern.  Client reports that he is mandated to complete tx because of his continued use while on probation.  However, he states that this is a good thing and he is glad to be coming to treatment. The patient has been attending and participating in the group setting. The patient and this writer did have a 1x1 this week to go over the patients assignments, we will have a rolling schedule  for 1x1 sessions every Wednesday at 8:30am. The purpose of these sessions is for this writer to read the assignment questions and then discuss as the patient struggles with literacy(TXPlan 4). The patient presented to all groups this week however was late for group on 9/7/17, this writer discussed the importance of being on time to group.   Dimension #5 - Relapse Potential - Risk 3, serious concern.   Client has hx of several previous treatment experiences and has resumed use afterwards.  He has some knowledge of coping strategies to prevent use but displays little knowledge of how to prevent relapse in the long term and how to arrest use if relapse occurs. The patient reports date of last use to be 8/20/17 in which he used marijuana. The patient reports thinking about the past, present, and future has been triggering for him this past week as it brings up emotions. The patient reports that he was able to manage triggers by keeping himself occupied and doing activities outside of his home.   Dimension #6 - Recovery Environment - Risk 3, serious concern.    Client reports living by himself in an apartment which he likes and that in his building there are AA meetings on site. The patient did report attending his 1st AA meeting and reports that it went well, he plans to go back this weekend. Client is not employed, at this time he is working on building structure and routine into his daily life. The patient reports that he had a meeting with [employer] for the gym membership on 9/7/17 at 1 pm. The patient reports he will attend a meeting over the weekend. He reports that his mother, sister and lady friends support him in general, including his sobriety.  Client reports many deaths among his family and friends over the last several years and that these have been triggers for resumed or increased use. Current [facility] probation for 2nd degree assault, PO Shayla Craft. No concerns at this time, the patient is  "maintaining contact with PO.    T) Patient educated on sober structure. Patient has completed 41 of 84 program hours at this time. Projected discharge date is 11/8/17. Current discharge plan is relapse prevention.     REG Ernandez        Psycho-Educational Curriculum  Date Attended  Psycho-Educational Curriculum  Date Attended    Acceptance   Shame/Guilt     1st Step   Anger/Rage     Affirmations   Mental Health  8/28     Mental health research project 8/29     Victim Mentality  8/30   Automatic Negative Thoughts   Anxiety     Cross Addiction   Co-Occurring Disorders     Stages of Change   Carolyn/Bipolar     Relapse   Trauma      Addictive Thoughts   Victim Identity     Coping Skills   Sober Structure  9/6   Relapse Prevention   Continuum of Care       Maslow Hierarchy of Needs 9/7   Medical Aspects   Non-12 Step Support     Brain/Neurotransmitters   Priorities  9/5   Medication Compliance   Spirituality  9/6   ADELE Alcohol/Drug Research   Weekend Planner     Physical Health   Educational Videos     Post Acute Withdrawal   1st Step     Pregnancy and Drug Use   2nd Step     Sexual Health   Assertive Communication     Short-Term/Long-Term Effects   My name is Jun Nicholson   Cross Addiction     Love languages  8/17     Listening Skills  8/14     Assertive Communication   God As We Understood Him     Boundaries & Happiness  8/16 HBO Relapse     Codependence    HBO What Is Addiction     Defense Mechanisms    Medical Aspects 1     Family Roles & Genograms  8/15 Medical Aspects 2     Goodbye Letter   National Geographic: Stress     Intimacy   PBS Depression Out of the Shadows     Needs/Dealbreakers in Relationships   The Anonymous People    Socialization Skills   [place]     Feelings  8/21 Julian Jean \"Highjacked Brain\"    Vulnerability  8/22     Emotional Regulation & Self Acceptance  8/23     Emma  8/24     ABC Model of Emotion   Rambo Stone Humor in Tx    Grief and Loss   The Mindfulness Movie  "   Healthy vs. Unhealthy Feelings   Jun GARCIA documentary     Meditation/Mindfulness  Weekly      Overconfidence/Complacency       Resentments       Stress

## 2021-06-12 NOTE — PROGRESS NOTES
Weekly Progress Note  Tyler Ramsey  1985  [id number]      D) Pt attended 3 groups  this week with 1 absences. The patient is in phase I of DOP. A) Staff facilitated groups and reviewed tx progress. Assessed for [facility]. R) No VAP needed at this time. Pt working on the following dimensions:  Dimension #1 - Withdrawal Potential - Risk 0, no concern.   Client reports no withdrawal symptoms he reports his date of last use to be 8/16/17 in which he used marijuana. The patient is to maintain abstinence (TXPlan 1).   Dimension #2 - Biomedical Conditions - Risk 0, No concern. Client reports he doesn't have a PCP although he did see a doctor a few months ago. Client reports he can seek care when needed despite not having a PCP or clinic to use for routine concerns. At this time the patient denies any emergent biomedical conditions. The patient is to practice living a healthy lifestyle by getting proper rest, nutrition, and exercise (TXPlan2).   Dimension #3 - Emotional/Behavioral/Cognitive - Risk 1, mild concern.  Client reports no current MH symptoms, dx, services. The patient does have a learning disability he reports having difficulty reading and completing reading/writing tasks. He reports no hx of abuse, no SI/HI, past or present.  Dimension #4 - Treatment Acceptance/Resistance - Risk 1, mild concern.  Client reports that he is mandated to complete tx because of his continued use while on probation.  However, he states that this is a good thing and he is glad to be coming to treatment.  Client has continued to use while on probation despite mandates and displays ambivalence about maintaining long-term sobriety. The patient has been attending and participating in the group setting. The patient and this writer will be scheduling a 1x1 session.    Dimension #5 - Relapse Potential - Risk 3, serious concern.   Client has hx of several previous treatment experiences and has resumed use afterwards.  He has some knowledge of  coping strategies to prevent use but displays little knowledge of how to prevent relapse in the long term and how to arrest use if relapse occurs. The patient reports date of last use to be 8/16/17 in which he used marijuana.   Dimension #6 - Recovery Environment - Risk 3, serious concern.    Client reports living by himself in an apartment which he likes and that in his building there are AA meetings on site.  He has not attended any, but is open to thinking about it.  Client is not employed, his days lack structure and routine. He reports that his mother, sister and lady friends support him in general, including his sobriety.  Client reports many deaths among his family and friends over the last several years and that these have been triggers for resumed or increased use.  He likes to work out and go out to eat; also likes to play video games although that activity is associated with using.  Current [facility] probation for 2nd degree assault, PO Shayla Craft.  Previous possession charges.  T) Patient educated on relationships. Patient has completed 12 of 84 program hours at this time. Projected discharge date is 11/8/17. Current discharge plan is relapse prevention.     REG Ernandez        Psycho-Educational Curriculum  Date Attended  Psycho-Educational Curriculum  Date Attended    Acceptance   Shame/Guilt     1st Step   Anger/Rage     Affirmations   Mental Health     Automatic Negative Thoughts   Anxiety     Cross Addiction   Co-Occurring Disorders     Stages of Change   Carolyn/Bipolar     Relapse   Trauma      Addictive Thoughts   Victim Identity     Coping Skills   Sober Structure     Relapse Prevention   Continuum of Care     Medical Aspects   Non-12 Step Support     Brain/Neurotransmitters   Priorities     Medication Compliance   Spirituality     ADELE Alcohol/Drug Research   Weekend Planner     Physical Health   Educational Videos     Post Acute Withdrawal   1st Step     Pregnancy and Drug Use    "2nd Step     Sexual Health   Assertive Communication     Short-Term/Long-Term Effects   My name is Jun GARCIA    Relationships   Cross Addiction     Love languages  8/17     Listening Skills  8/14     Assertive Communication   God As We Understood Him     Boundaries & Happiness  8/16 HBO Relapse     Codependence    HBO What Is Addiction     Defense Mechanisms    Medical Aspects 1     Family Roles & Genograms  8/15 Medical Aspects 2     Goodbye Letter   National Geographic: Stress     Intimacy   PBS Depression Out of the Shadows     Needs/Dealbreakers in Relationships   The Anonymous People    Socialization Skills   [place]     Feelings   Julian Jean \"Highjacked Brain\"    ABC Model of Emotion   Rambo Stone Humor in Tx    Grief and Loss   The Mindfulness Movie    Healthy vs. Unhealthy Feelings   Jun GARCIA documentary     Meditation/Mindfulness  Weekly      Overconfidence/Complacency       Resentments       Stress         "

## 2021-06-12 NOTE — PROGRESS NOTES
1x1 Session:    The patient and this writer met for our weekly 1x1 sessions to discuss treatment plan goals as well as complete some of the patients assignment/tasks. The patient reported at the last session that he wanted to work on goal setting, today we discussed goals and the patient was able to identify short term, intermittent, and long term goals. The patient and this writer also discussed the patients charges that he has at this time, as well as the positive UA results from the UA taken on 8/29/17. The patient reports that his date of last use was on 8/20/17 and that the levels should be dropping. The patient identified some of his biggest supports to be his mother and sister as they are both consistently looking out for him and willing to help in any way possible. The patient reports he would like assistance in obtaining insurance at this time, we discussed having a meeting with Dorcas Delaney, Financial  and the patient was very open to this.     REG Ernandez 8/30/2017
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
## 2021-06-13 NOTE — PROGRESS NOTES
Individual Treatment Plan    Patient  Name: Tyler Ramsey  MRN: [id number]   : 1985  Admit Date: 17  Date of Initial Service Plan: 17  Tentative Discharge Date: 17    Counselor: REG Ernandez      Dimension 1: Acute Intoxication/Withdrawal Potential, Risk level: 0    Problem: Patients date of last use was on 9/15/17  Goal: Maintain abstinence   Must be reached to complete treatment? Yes  Methods/Strategies (must include amount and frequency): The patient is to attend groups M-Th 9:30am- 12:30pm. He is to share thought, feelings and urges to use as well as discuss how sober support is beneficial in order to maintain long term abstinence.   Target Date: Ongoing (18)  Completion Date:     Problem: Maintain awareness of chemicals in the body.   Goal: Comply with staff requests for breathalyzers and UA's.   Must be reached to complete treatment? N/A  Methods/Strategies (must include amount and frequency): Patient will be given random breathalyzers and UA's through treatment as a means of awareness and accountability.   Target Date: Ongoing (18)  Completion Date:         Dimension 2: Biomedical Conditions/Complications, Risk level: 0    Problem: Patient reports not having a primary care provider  Goal: Obtain a primary care provider.   Must be reached to complete treatment? Yes  Methods/Strategies (must include amount and frequency): The patient has been given a list of providers in the area for primary care. The patient is to explore different options and establish.   Target Date: 17  Completion Date:     Problem: The patient has tooth pain and does not have an established dental provider.   Goal: Establish dental provider and schedule appointment.   Must be reached to complete treatment? Yes  Methods/Strategies (must include amount and frequency): The patient reports tooth pain, he has been given a list of providers in the area that accept his insurance. The patient is  to explore and set up a dental appointment to address his concerns.   Target Date: 11/30/17  Completion Date:         Dimension 3: Emotional/Behavioral/Cognitive, Risk level: 1    Problem: Patient does have a learning disability, he is also has literacy issues.   Goal: Help the patient understand all documentation and expectations.   Must be reached to complete treatment? Yes  Methods/Strategies (must include amount and frequency): The patient will be explained all documents and will be helped when writing out weekly check ins and assignments. The patient will be read and asked for understanding of all documents.   Target Date: Ongoing ( 1/18/18)  Completion Date:     Problem: Patient struggles with impulse control skills.   Goal: Develop skills to prevent impulsive use.   Must be reached to complete treatment? Yes  Methods/Strategies (must include amount and frequency): The patient and this writer will identify triggering events that can lead to impulses. The patient will then identify ways to control his impulses.   Target Date: 11/6/17  Completion Date:       Dimension 4: Readiness to Change, Risk level 0    Problem: Lack of adherence to the expectations of the OP programs.   Goal: Comply with the treatment contract.   Must be reached to complete treatment? Yes  Methods/Strategies (must include amount and frequency): The patient has been placed on a treatment contract for abstinence and group attendance. The contract was put into place on 9/14/17.   Target Date: Ongoing ( 11/6/17)  Completion Date:     Problem: Gain insight to addiction  Goal: Complete all written assignments given.   Must be reached to complete treatment? Yes  Methods/Strategies (must include amount and frequency): The patient and this writer meet in 1x1 sessions to complete all assignments and to ensure comprehension.   Target Date: 11/6/17.  Completion Date:       Dimension 5: Relapse/Continued Use/Continued Problem Potential, Risk level:  3    Problem: Patient has a history of several treatments with short periods of sobriety.   Goal: Maintain sobriety through coping skills   Must be reached to complete treatment? Yes and ongoing  Methods/Strategies (must include amount and frequency): The patient is to identify 7 triggers and 10 coping skills to arrest use and prevent relapse.   Target Date: 11/6/17  Completion Date:     Dimension 6: Recovery Environment, Risk level: 3    Problem: Patient is on probation for a 2nd degree assault.   Goal: Maintain compliance with probation  Must be reached to complete treatment? Yes  Methods/Strategies (must include amount and frequency): The patient is to maintain contact with probation and comply with UA requests and expectations.   Target Date: Ongoing until probation expires.   Completion Date:     Problem: Lack of meaningful activities  Goal: Build meaningful structure into your weekly schedule by attending sober support groups and activities that promote overall wellness weekly.   Must be reached to complete treatment? Yes  Methods/Strategies (must include amount and frequency): Attend sober support groups, identify volunteer work or employment opportunities, identify specific activities that promote wellness (gym- patient is enrolled in the [employer]SE program)   Target Date: Ongoing ( 1/18/18)  Completion Date:         Resources  Resources to which the patient is being referred for problems when problems are to be addressed concurrently by another provider: Probation: [facility]       By signing this document, I am acknowledging that I was actively and directly involved in the development of my treatment plan.           Patient  Signature_________________________________________         Date__________________        Staff Signature  Radha Gaming [facility]    Date: 11/3/2017 , 11:43am

## 2021-06-13 NOTE — PROGRESS NOTES
Tyler Ramsey attended 3 hours of group therapy today. The patient graduated from the group today.     11/6/2017 1:48 PM Radha Gaming
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
## 2021-06-13 NOTE — PROGRESS NOTES
Weekly Progress Note  Tyler Ramsey  1985  [id number]      D) Pt attended 3 groups this week with 0 absences. Patient attended 0 individual sessions this week. Patient is now in phase II of DOP. A) Staff facilitated groups and reviewed tx progress. Ass[facility] for [facility]. R) No VAP needed at this time.   Any significant events, defines as events that impact patients relationship with others inside and outside of treatment No  Indicate any changes or monitoring of physical or mental health problems No    Indicate involvement by any outside supports Yes: probation  IAPP reviewed and modified as needed. NA    Pt working on the following dimensions:  Dimension #1 - Withdrawal Potential - Risk 0.  Patients UA results from 10/26/17 were negative.   Specific goals from treatment plan addressed this week:  -Remain abstinent  from all chemicals. (TXPlan 1;1)  Effectiveness of strategies:  -Effective:The patient reports no use and UA results from 10/26/17 were negative from all substances.     Dimension #2 - Biomedical - Risk 0.  No emergent medical concerns.   Specific goals from treatment plan addressed this week:  -Establish primary care provider (TXPlan 2;2)  -Establish primary dental provider (TXPlan 2;3)  Effectiveness of strategies:  -Not effective: The patient has yet to establish a primary care provider, he will be given information on providers in the area.   -Not effective: The patient has been given a list of primary dental providers that accept his insurance.    Dimension #3 - Emotional/Behavioral/Cognitive - Risk 1. Client has some impulse control issues .  Specific goals from treatment plan addressed this week:   -Mental health assessment 10/27/17   Effectiveness of strategies:  -Effective: This is a rescheduled appointment due to scheduling conflicts. The patient is ready to complete the assessment.     Dimension #4 - Treatment Acceptance/Resistance - Risk 1. Client is on a behavior contract,for attendance and  use.  Specific goals from treatment plan addressed this week:  -Present to group on time(TXPlan 4;3)  -Maintain treatment contract   Effectiveness of strategies:  -Effective: Client has been in group on time each day  -Effective:The patient has maintained treatment contract, continues to have clean UA results as well as good attendance.     Dimension #5 - Relapse Potential - Risk 3.Client remains vulnerable to continued use  Specific goals from treatment plan addressed this week:  -Develop coping strategies to triggers (TXPlan 5;2)  Effectiveness of strategies:  -Effective: Patient reports that death is a trigger for him to use. He reports that he has been attending the DOSE program and has therapy to cope.     Dimension #6 - Recovery Environment - Risk 3.   Specific goals from treatment plan addressed this week:  -Attend 2 sober support meetings per week (TXPlan 6;2)  Effectiveness of strategies:  -Not effective: Patient reports attendance at 1 meeting. The patient reports attending the DOSE program regularly.   T) Treatment plan updated no.  Patient notified and in agreement NA.  Patient educated on sober structure. Patient has completed 120 of 84 program hours at this time. Projected discharge date is TBD. Current discharge plan is Relapse Prevention.     REG Ernandez  10/27/2017, 8:18 AM        Psycho-Educational Curriculum  Date Attended  Psycho-Educational Curriculum  Date Attended    Acceptance  9/11 Shame/Guilt      1st Step    Anger/Rage   10/12/17   Affirmations    Mental Health  8/28   Cost of addiction, self acceptance, serenity  9/12 Mental health research project 8/29   Sheila Story and DSM 5  9/13 Victim Mentality  8/30   Automatic Negative Thoughts    Anxiety      Cross Addiction    Co-Occurring Disorders      Stages of Change    Carolyn/Bipolar      Relapse    Trauma         Anger & Overall mental health  10/16     Protective factors  10/17     Mental health bingo  10/18     Mental health   "10/19   Addictive Thoughts    Victim Identity      Coping Skills    Sober Structure  9/6   Relapse Prevention    Continuum of Care      Roadblocks to healthy thinking 9/18 Sober support/purpose 10/23    Balance & Anonymous people  9/19 8 Dimensions of wellness   10/24    Emotional Regulation  9/20 Time management   10/26   Thought Processes  9/21           Maslow Hierarchy of Needs 9/7   Medical Aspects    Non-12 Step Support      Brain/Neurotransmitters    Priorities  9/5   Medication Compliance    Spirituality  9/6   ADELE Alcohol/Drug PPT  9/26 Weekend Planner      What drugs do in your body video  9/25       Medical Aspects Research Project  9/27       Medical Aspects Jeopardy  9/28       Physical Health    Educational Videos      Post Acute Withdrawal    1st Step      Pregnancy and Drug Use    2nd Step      Sexual Health    Assertive Communication      Short-Term/Long-Term Effects    My name is Jun GARCIA     Relationships    Cross Addiction      Love languages  8/17       Listening Skills  8/14       Assertive Communication  10/3 God As We Understood Him      Boundaries & Happiness  8/16 HBO Relapse      Codependence   10/4 HBO What Is Addiction      Defense Mechanisms     Medical Aspects 1      Family Roles & Genograms  8/15 Medical Aspects 2      Communication styles  10/2       Spirituality  10/4       Healthy Relationships  10/5       Goodbye Letter    National Geographic: Stress      Intimacy    PBS Depression Out of the Shadows      Needs/Dealbreakers in Relationships    The Anonymous People     Socialization Skills    [place]      Feelings  8/21 Julian Jean \"Highjacked Brain\"    Vulnerability  8/22       Emotional Regulation & Self Acceptance  8/23       Emma  8/24       ABC Model of Emotion    Rambo Stone Humor in Tx     Grief and Loss    The Mindfulness Movie     Healthy vs. Unhealthy Feelings   10/9/17 Jun GARCIA documentary      Meditation/Mindfulness  Weekly        Overconfidence/Complacency        "   Resentments          Stress

## 2021-06-13 NOTE — PROGRESS NOTES
Weekly Progress Note  Tyler Ramsey  1985  [id number]      D) Pt attended 4 groups  this week with 0 absences. The patient is in phase I of DOP. A) Staff facilitated groups and reviewed tx progress. Assessed for [facility]. R) No VAP needed at this time. Pt working on the following dimensions:  Dimension #1 - Withdrawal Potential - Risk 0, no concern. Patient reports no withdrawal symptoms he reports his date of last use to be 9/15/17 in which he used marijuana. The patient is to maintain abstinence and report any relapses immediatley (TXPlan 1;1). The patients UA levels will continue to be monitored to ensure they are dropping or a higher level of care will be recommended.   Dimension #2 - Biomedical Conditions - Risk 0, No concern. Patient reports he doesn't have a PCP although he did see a doctor a few months ago. Patient reports he can seek care when needed despite not having a PCP or clinic to use for routine concerns. The patient is to explore and establish primary care providers and dental providers (TXPlan 2; 2&3). The patient reports that he has been working out on a regular basis at the [employer], he finds that he has more energy and is feeling good about this.   Dimension #3 - Emotional/Behavioral/Cognitive - Risk 1, mild concern.  Patient reports no current MH symptoms, dx, services. The patient does have a learning disability he reports having difficulty reading and completing reading/writing tasks. Patient does not endorse thoughts of harming self or others. The patient reports he has been having a hard time managing his grief and loss over the last couple months, he has has a significant amount of friends/family pass away in the last couple years and attributes this to his use. The patient has an assignment that we will complete in the next 1x1 session to identify 3 positive traits and 3 traits he would like to change (TXPlan 3;2).   Dimension #4 - Treatment Acceptance/Resistance - Risk 1,  mild concern.  Patient reports that he is mandated to complete tx because of his continued use while on probation.  However, he states that this is a good thing and he is glad to be coming to treatment. The patient has been attending and participating in the group setting. The patient and this writer did have a 1x1 this week, we have a rolling schedule for 1x1 sessions every Wednesday at 8:30am. The purpose of these sessions is for this writer to read the assignment questions and then discuss as the patient struggles with literacy(TXPlan 4-Complete). At this time the patient has been placed on a treatment contract, the patient is to remain abstinent and attend all groups and individual sessions on time. The patient has done well with this in the last week.   Dimension #5 - Relapse Potential - Risk 3, serious concern.  Patient has a history of several previous treatment experiences and has resumed use afterwards.  He has some knowledge of coping strategies to prevent use but displays little knowledge of how to prevent relapse in the long term and how to arrest use if relapse occurs. The patient reports his date of last use to be 9/15/17, he reports that he had been using marijuana regularly and since that date he has stopped. The patient and this writer will review an assignment to identify his 7 triggers and 10 coping skills to arrest use (TXPlan 5;2).  Dimension #6 - Recovery Environment - Risk 3, serious concern.  Client reports living by himself in an apartment which he likes. The patient has been to a few sober support meetings however does not have regular attendance. The patient is to identify 2 groups he can go to on a weekly basis and provide verification for attendance (TXPlan 6;1).  The patient reports that he is now a member of the [employer] through the DOSE program. Current [facility] probation for 2nd degree assault, PO Shayla Craft.   T) Patient educated on medical aspects. Patient has  completed 75 of 84 program hours at this time. Projected discharge date is 11/8/17. Current discharge plan is relapse prevention.     REG Ernandez        Psycho-Educational Curriculum  Date Attended  Psycho-Educational Curriculum  Date Attended    Acceptance  9/11 Shame/Guilt     1st Step   Anger/Rage     Affirmations   Mental Health  8/28   Cost of addiction, self acceptance, serenity  9/12 Mental health research project 8/29   Sheila Story and DSM 5  9/13 Victim Mentality  8/30   Automatic Negative Thoughts   Anxiety     Cross Addiction   Co-Occurring Disorders     Stages of Change   Carolyn/Bipolar     Relapse   Trauma      Addictive Thoughts   Victim Identity     Coping Skills   Sober Structure  9/6   Relapse Prevention   Continuum of Care     Roadblocks to healthy thinking 9/18     Balance & Anonymous people  9/19     Emotional Regulation  9/20     Thought Processes  9/21       Maslow Hierarchy of Needs 9/7   Medical Aspects   Non-12 Step Support     Brain/Neurotransmitters   Priorities  9/5   Medication Compliance   Spirituality  9/6   ADELE Alcohol/Drug PPT  9/26 Weekend Planner     What drugs do in your body video  9/25     Medical Aspects Research Project  9/27     Medical Aspects Jeopardy  9/28     Physical Health   Educational Videos     Post Acute Withdrawal   1st Step     Pregnancy and Drug Use   2nd Step     Sexual Health   Assertive Communication     Short-Term/Long-Term Effects   My name is Jun GARCIA    Relationships   Cross Addiction     Love languages  8/17     Listening Skills  8/14     Assertive Communication   God As We Understood Him     Boundaries & Happiness  8/16 HBO Relapse     Codependence    HBO What Is Addiction     Defense Mechanisms    Medical Aspects 1     Family Roles & Genograms  8/15 Medical Aspects 2     Goodbye Letter   National Geographic: Stress     Intimacy   PBS Depression Out of the Shadows     Needs/Dealbreakers in Relationships   The Anonymous People   "  Socialization Skills   [place]     Feelings  8/21 Julian Jean \"Highjacked Brain\"    Vulnerability  8/22     Emotional Regulation & Self Acceptance  8/23     Emma  8/24     ABC Model of Emotion   Rambo Stone Humor in [place]    Grief and Loss   The Mindfulness Movie    Healthy vs. Unhealthy Feelings   Jun GARCIA documentary     Meditation/Mindfulness  Weekly      Overconfidence/Complacency       Resentments       Stress         "

## 2021-06-13 NOTE — PROGRESS NOTES
1x1 Session:     The patient and this writer sat down for our weekly 1x1 session(53 minutes) to review treatment goals, discuss how programming was going, as well as complete some of the required assignments. The patient and this writer discussed the patients use patterns, he reports that he was not being honest over the past few weeks and reports using up until 9/15/17. The patient reports he was using small amounts of marijuana on a regular basis and alcohol 1-2x per week. The patient reports that his use was due to struggling with the loss of a friend and not having any structured activities. The patient and this writer discussed dental work as this is a goal of the patients. The patient was given a list of in network dental providers as well as the phone number for [facility] to obtain a new insurance card as his has been misplaced. The patient reports no emergent emotional or behavioral concerns, he states that he feels he is able to manage his mental health effectively and that he is working through the grief process. The patient denied any SI/SIB at the time of the meeting. The patient and this writer completed the personal recovery planner in his assignment packet today, we discussed the question and the patient provided an answer. This is done due to the patient struggling with literacy and comprehension. The patient and this writer discussed incorporating in more daily activities and structure, the patient reports that if he has things to keep him busy he will not revert back to old ways of use. The patient reports that he has gotten a gym membership through the [employer] [facility] and finds it helpful and beneficial to his recovery. The patient stated that getting physically active and sweating helps him manage his cravings and triggers. The patient requested easy reading material to practice his literacy, this writer will provide this to the patient.     REG Ernandez  9/20/2017

## 2021-06-13 NOTE — PROGRESS NOTES
Guardian calls requesting referral to behavioral health. Returned call and grandma states that insurance changed and Manuel cannot see the counselor he was previously seeing. Gave mom number to A to help find a provider that would be covered by insurance. Please advise. Okay to send referral? Thank you!   Weekly Progress Note  Tyler Ramsey  1985  [id number]      D) Pt attended 4 groups this week with 0 absences. Patient attended 0 individual sessions this week. A) Staff facilitated groups and reviewed tx progress. [facility] for [facility]. R) No VAP needed at this time. Pt working on the following dimensions:    Dimension #1 - Withdrawal Potential - Risk 0.  Client has not been able to remain abstinent for a 30 day period.   Specific goals from treatment plan addressed this week:  Remain abstinent  from all chemicals.   Effectiveness of strategies:  Not effective, client reports no use, but drug screen was positive for THC 10/11/17. Client reports no use but being in a car with others who were using. Will re screen next session.     Dimension #2 - Biomedical - Risk 0.  No new medial concerns reported this week. Ongoing cold. .   Specific goals from treatment plan addressed this week:  Follow  recommendations     Effectiveness of strategies:  Effective, taking recommended OTC medications. Night quill. Feeling better      Dimension #3 - Emotional/Behavioral/Cognitive - Risk 1. Client has some impulse control issues, .  Specific goals from treatment plan addressed this week:   continued reminder for mental health assessment next week  Effectiveness of strategies:  Effective, client is prepared for evaluation, paper wiork    Dimension #4 - Treatment Acceptance/Resistance - Risk 1. Client is on a behavior contract,for attendance and use.  Specific goals from treatment plan addressed this week:  Present to group on time  Effectiveness of strategies:  Effective, client has been in group on time each day    Dimension #5 - Relapse Potential - Risk 3.Client remains vulnerable to continued use,   Specific goals from treatment plan addressed this week:  Coping stradegies to triggers  Effectiveness of strategies:  Effective, client reports avoiding friends to go out, working out.     Dimension #6 - Recovery Environment - Risk 3.    Specific goals from treatment plan addressed this week:  Go to 2 meetings a week  Effectiveness of strategies:  Ineffective. Client reports 1 sober support meeting, no sponsor  T) Treatment plan updated no.  Patient notified and in agreement NA.  Patient educated on Feelings. Patient has completed 99of 84 program hours at this time. Projected discharge date is TBD. Current discharge plan is Relapse Prevention.     RGE Elder  10/13/2017, 10:36 AM        Psycho-Educational Curriculum  Date Attended  Psycho-Educational Curriculum  Date Attended    Acceptance  9/11 Shame/Guilt      1st Step    Anger/Rage   10/12/17   Affirmations    Mental Health  8/28   Cost of addiction, self acceptance, serenity  9/12 Mental health research project 8/29   Sheila Story and DSM 5  9/13 Victim Mentality  8/30   Automatic Negative Thoughts    Anxiety      Cross Addiction    Co-Occurring Disorders      Stages of Change    Carolyn/Bipolar      Relapse    Trauma       Addictive Thoughts    Victim Identity      Coping Skills    Sober Structure  9/6   Relapse Prevention    Continuum of Care      Roadblocks to healthy thinking 9/18       Balance & Anonymous people  9/19       Emotional Regulation  9/20       Thought Processes  9/21           Maslow Hierarchy of Needs 9/7   Medical Aspects    Non-12 Step Support      Brain/Neurotransmitters    Priorities  9/5   Medication Compliance    Spirituality  9/6   ADELE Alcohol/Drug PPT  9/26 Weekend Planner      What drugs do in your body video  9/25       Medical Aspects Research Project  9/27       Medical Aspects Jeopardy  9/28       Physical Health    Educational Videos      Post Acute Withdrawal    1st Step      Pregnancy and Drug Use    2nd Step      Sexual Health    Assertive Communication      Short-Term/Long-Term Effects    My name is Jun ARGUETAJodi Nicholson    Cross Addiction      Love languages  8/17       Listening Skills  8/14       Assertive Communication  10/3 God As We  "Understood Him      Boundaries & Happiness  8/16 HBO Relapse      Codependence   10/4 HBO What Is Addiction      Defense Mechanisms     Medical Aspects 1      Family Roles & Genograms  8/15 Medical Aspects 2      Communication styles  10/2       Spirituality  10/4       Healthy Relationships  10/5       Goodbye Letter    National Geographic: Stress      Intimacy    PBS Depression Out of the Shadows      Needs/Dealbreakers in Relationships    The Anonymous People     Socialization Skills    [place]      Feelings  8/21 Julian Jean \"Highjacked Brain\"    Vulnerability  8/22       Emotional Regulation & Self Acceptance  8/23       Emma  8/24       ABC Model of Emotion    Rambo Stone Humor in [place]     Grief and Loss    The Mindfulness Movie     Healthy vs. Unhealthy Feelings   10/9/17 Jun GARCIA documentary      Meditation/Mindfulness  Weekly        Overconfidence/Complacency          Resentments          Stress              "

## 2021-06-13 NOTE — PROGRESS NOTES
"1x1 Session:     The patient and this writer met for 40 minutes to review the patients goals and to complete his assignment packet. The patient reported that he has not used since the 15th and is very proud of himself at this time, he attributes his ability to stop to his new ability to work out on a regular basis as well as accountability. The patient and this writer reviewed the goals that the patient wanted to work on and at this time the patient has completed his short term goal of contacting [employer] from the [employer] and getting a membership. The patient and this writer reviewed what his intermittent goal is- attend 4 meetings in a month and complete treatment. We discussed meeting attendance as well as the barriers to getting the patient into a meeting and he stated that being tired and the day going by fast as to factors. We discussed motivation and he stated that when he is not using marijuana he has a higher motivation. The patient is going to work on getting to more meetings in the coming weeks. We completed the assignments \"what I wanted to be when I grew up\" as well as the reflection paper on his drug of choice. The patient and this writer discussed pausing the 1x1 sessions for 2 weeks and resuming on 10/18/17. The patient and this writer discussed that the patient needs to have a clean UA prior to stepping down to 2x per week as well as transferring to the relapse prevention group. The patient was open to this and okay with the decision.     REG Ernandez 9/27/2017    "

## 2021-06-13 NOTE — PROGRESS NOTES
[facility]   Mental Health and Addiction Care   [facility], [facility], and [facility]    [phone] or [phone]   Master Plan  Client Name:  Tyler Ramsey  MRN: [id number]   Counselor: REG Ernandez    Title:Dimension #1 - Withdrawal Potential - Risk 0, no concern.    Plan Date:   9/29/2017  Diagnosis:   Cannabis Use Disorder, Moderate  Problem: Patient reports no withdrawal symptomsand none when he has had periods of sobriety in the past. Upon intake his date of last use was 8/5/17 however the patient reported relapses of marijuana while enrolled. His sobriety date is 9/15/17.     Goal: Begin Date: 8/14/17 Target Date: 11/14/17  Maintain abstinence throughout  Outpatient Treatment in order to avoid experiencing withdrawal symptoms and to meet OP expectations.   Method 1: Begin Date:8/14/17 Target Date: 11/14/17 Date Completed:   Attend OP groups Monday- Thursday 9:30am-12:30 pm and share thoughts, feelings and urges to use, as well as sober supports with staff and peers in order to maintain awareness of details shaping your recovery process.  Method 2: Begin Date:8/14/17 Target Date: 11/14/17 Date Completed:   Comply with staff requests for breathalyzer and UA's.       Title:Dimension #2 - Biomedical Conditions - Risk 0, No concern.   Plan Date:   9/29/2017  Diagnosis:   Cannabis Use Disorder, Moderate  Problem: Patient reports he doesn't have a PCP although he did see a doctor a few months ago.  He reports no current medical conditions, no hx of chronic conditions.    Goal: Begin Date: 8/14/17 Target Date: 11/14/17  Practice living a healthy lifestyle on a daily basis with proper rest, nutrition and exercise.   Method 1: Begin Date:8/14/17 Target Date: 11/14/17 Date Completed:   Continue to follow recommendations from your personal care provider regarding medical issues. Inform staff immediately of any changes in your health that may affect your active participation in group therapy or  "attendance.  Method 2: Begin Date:9/29/17 Target Date: 11/14/17 Date Completed:  Establish a primary care provider   Method 3: Begin Date:9/29/17 Target Date: 11/14/17 Date Completed:  Establish a dental provider  Is Nicotine dependence indicated on the assessment? YES  Method 2: Begin Date:8/14/17 Target Date: 11/14/17 Date Completed: 8/14/17  Staff will provide client with information on tobacco cessation,and tools for quitting.       Title:Dimension #3 - Emotional/Behavioral/Cognitive - Risk 1, mild concern.    Plan Date:   9/29/2017  Diagnosis:   Cannabis Use Disorder, Moderate  Problem:Patient reports no current MH symptoms, dx, services or meds.  He reports that in the past (as a teenager) he did see a psychiatrist and his dx were \"nerve problems\" and a learning disability.  No meds at that time.  He reports having difficulty reading and completing reading/writing tasks.     Goal: Begin Date: 8/14/17 Target Date: 11/14/17   To treat mental health effectively while attending treatment in order to increase your ability to meet goals and treatment expectations.   Method 1: Begin Date:8/14/17 Target Date: 11/14/17 Date Completed:   Begin to journal on a daily basis recording feelings and event of the day. Reflecting on this daily. Report in group how this is beneficial.  Method 2: Begin Date:8/14/17 Target Date: 11/14/17 Date Completed:   Identify 3 personal traits you like about yourself and 3 that you would like to see changes in. Develop a plan to initiate those changes. What will need to happen for these goals to be successful? Share this with counselor.       Title:Dimension #4 - Treatment Acceptance/Resistance - Risk 2, moderate concern.   Plan Date:   9/29/2017  Diagnosis:   Cannabis Use Disorder, Moderate  Problem: Patient reports that he is mandated to complete tx because of his continued use while on probation.  However, he states that this is a good thing and he is glad to be coming to treatment. " "     Goal: Begin Date: 8/14/17 Target Date: 11/14/17  To follow through with intentions to treat chemical dependency concerns while meeting OP treatment expectations in order to graduate successfully from our program.   Method 1: Begin Date:8/14/17 Target Date: 9/18/17 Date Completed: 9/18/17  Complete 1st Step (Use History and Consequences) assignment while in Phase I of treatment and present to peers in group. Reflect on feedback received from peers and report findings to staff.   Method 2: Begin Date:8/14/17 Target Date: 11/10/17  Date Completed: 9/27/17  Complete all written assignments as assigned by staff including your \"goodbye letter\" and \"relapse prevention plan\" prior to graduation.  Contact staff with questions or concerns about written and oral assignments while attending group therapy.  Method 3: Begin Date:8/14/17 Target Date: 11/14/17 Date Completed:   If for any reason you cannot attend group therapy or you will be tardy, contact staff as soon as possible at [phone](S) or [phone](T). Three unexcused absences  is considered voluntary discharge from the outpatient program.      Title:Dimension #5 - Relapse Potential - Risk 3, serious concern.    Plan Date:   9/29/2017  Diagnosis:   Cannabis Use Disorder, Moderate  Problem:Patient has hx of several previous treatment experiences and has resumed use afterwards.      Goal: Begin Date: 8/14/17Target Date: 11/14/17  To deal effectively with relapse triggers and stressors while building coping skills in order to handle life events without resorting to drug/alcohol use.   Method 1: Begin Date:8/14/17 Target Date: 11/14/17 Date Completed:   Participate in OP group check-ins consistently as way of increasing self-awareness and connecting honestly with staff and peers.   Method 2: Begin Date:8/14/17 Target Date: 11/14/17 Date Completed:   Develop a list of 7 triggers to your use and identify 10 coping skills you can use to arrest the urge to use. " Submit to counselor when finished.    Method 3: Begin Date:8/14/17 Target Date: 11/14/17Date Completed:   Report any relapses, if any, on any substances of abuse to staff immediately.       Title:Dimension #6 - Recovery Environment - Risk 3, serious concern.      Plan Date:   9/29/2017  Diagnosis:   Cannabis Use Disorder, Moderate  Problem:Patient reports living by himself in an apartment which he likes and that in his building there are AA meetings on site. Patient reports many deaths among his family and friends over the last several years and that these have been triggers for resumed or increased use.  He likes to work out and go out to eat; also likes to play video games although that activity is associated with using.  Current [facility] probation for 2nd degree assault, PO Shayla Craft.     Goal: Begin Date: 8/14/17 Target Date: 11/14/17  To build meaningful structure into your weekly schedule by attending specific recovery activities on a daily basis   Method 1: Begin Date:8/14/17 Target Date: 11/14/17 Date Completed:   Find 2 sober support groups you feel comfortable attending on a weekly basis and inform counselor how these meetings are impacting you.Obtain a sponsor before 2nd phase of treatment.    Method 2: Begin Date:8/14/17 Target Date: 11/14/17  Date Completed:   Develop a Bucket list. What activities would you like to take part in. Set goals in intervals.  such as 3 month, 6 month,1 year, 3 years and 5 years, what barriers do you identify at this time for these goals to happen.     By signing this document, I am acknowledging that I was actively and directly involved in the development of my treatment plan.  I have been a participant in the creation of my individual treatment plan.       Client Signature_________________________________________         Date__________________         Staff Signature Radha Gaming [facility] 9/29/2017

## 2021-06-13 NOTE — PROGRESS NOTES
Weekly Progress Note  Tyler Ramsey  1985  [id number]      D) Pt attended 3 groups  this week with 1 absences. The patient is in phase I of DOP. A) Staff facilitated groups and reviewed tx progress. Assessed for [facility]. R) No VAP needed at this time. Pt working on the following dimensions:  Dimension #1 - Withdrawal Potential - Risk 0, no concern.   Client reports no withdrawal symptoms he reports his date of last use to be 8/20/17 in which he used marijuana. The patient is to maintain abstinence (TXPlan 1). The patient reports no use in the last 7 days and reports that he has not experienced withdrawal symptoms. The patients UA on 9/12/17 continues to indicate positive for THC, this writer will continue to monitor levels to ensure the patient is abstaining. Dimension #2 - Biomedical Conditions - Risk 0, No concern. Client reports he doesn't have a PCP although he did see a doctor a few months ago. Client reports he can seek care when needed despite not having a PCP or clinic to use for routine concerns.The patient is to practice living a healthy lifestyle by getting proper rest, nutrition, and exercise (TXPlan2). A dental list has been given to the patient for [facility] patients, the patient will schedule appointment. No emergent concerns at this time.   Dimension #3 - Emotional/Behavioral/Cognitive - Risk 1, mild concern.  Client reports no current MH symptoms, dx, services. The patient does have a learning disability he reports having difficulty reading and completing reading/writing tasks. Patient does not endorse thoughts of harming self or others. At this time there are no emergent emotional, behavioral concerns.   Dimension #4 - Treatment Acceptance/Resistance - Risk 1, mild concern.  Client reports that he is mandated to complete tx because of his continued use while on probation.  However, he states that this is a good thing and he is glad to be coming to treatment. The patient has been attending and  participating in the group setting. The patient and this writer did not have a 1x1 this week as the patient was running late, we have a rolling schedule for 1x1 sessions every Wednesday at 8:30am. The purpose of these sessions is for this writer to read the assignment questions and then discuss as the patient struggles with literacy(TXPlan 4). The patent presented to 3/4 groups this week however was late all 3 days he did come. This writer did develop a success contract to review with the patient next week when he presents to group. The purpose of the contract is to hold the patient accountable for being on time and remaining abstinent.   Dimension #5 - Relapse Potential - Risk 3, serious concern.   Client has hx of several previous treatment experiences and has resumed use afterwards.  He has some knowledge of coping strategies to prevent use but displays little knowledge of how to prevent relapse in the long term and how to arrest use if relapse occurs. The patient reports date of last use to be 8/20/17 in which he used marijuana. The patient reports no use in the last 7 days. He did not endorse any triggering events.   Dimension #6 - Recovery Environment - Risk 3, serious concern.  Client reports living by himself in an apartment which he likes and that in his building there are AA meetings on site. The patient did report attending his 1st AA meeting and reports that it went well, he plans to go back this weekend. Client is not employed, at this time he is working on building structure and routine into his daily life. The patient reports that he had a meeting with [employer] for the gym membership. The patient reports he will attend a meeting over the weekend. Client reports many deaths among his family and friends over the last several years and that these have been triggers for resumed or increased use. Current [facility] probation for 2nd degree assault, PO Shayla Craft.  T) Patient educated on acceptance.  "Patient has completed 50 of 84 program hours at this time. Projected discharge date is 11/8/17. Current discharge plan is relapse prevention.     REG Ernandez        Psycho-Educational Curriculum  Date Attended  Psycho-Educational Curriculum  Date Attended    Acceptance  9/11 Shame/Guilt     1st Step   Anger/Rage     Affirmations   Mental Health  8/28   Cost of addiction, self acceptance, serenity  9/12 Mental health research project 8/29   Sheila Story and DSM 5  9/13 Victim Mentality  8/30   Automatic Negative Thoughts   Anxiety     Cross Addiction   Co-Occurring Disorders     Stages of Change   Carolyn/Bipolar     Relapse   Trauma      Addictive Thoughts   Victim Identity     Coping Skills   Sober Structure  9/6   Relapse Prevention   Continuum of Care       Maslow Hierarchy of Needs 9/7   Medical Aspects   Non-12 Step Support     Brain/Neurotransmitters   Priorities  9/5   Medication Compliance   Spirituality  9/6   ADELE Alcohol/Drug Research   Weekend Planner     Physical Health   Educational Videos     Post Acute Withdrawal   1st Step     Pregnancy and Drug Use   2nd Step     Sexual Health   Assertive Communication     Short-Term/Long-Term Effects   My name is Jun GARCIA    Relationships   Cross Addiction     Love languages  8/17     Listening Skills  8/14     Assertive Communication   God As We Understood Him     Boundaries & Happiness  8/16 HBO Relapse     Codependence    HBO What Is Addiction     Defense Mechanisms    Medical Aspects 1     Family Roles & Genograms  8/15 Medical Aspects 2     Goodbye Letter   National Geographic: Stress     Intimacy   PBS Depression Out of the Shadows     Needs/Dealbreakers in Relationships   The Anonymous People    Socialization Skills   [place]     Feelings  8/21 Julian Jean \"Highjacked Brain\"    Vulnerability  8/22     Emotional Regulation & Self Acceptance  8/23     Emma  8/24     ABC Model of Emotion   Rambo Stone Humor in Tx    Grief and Loss   The Mindfulness " Movie    Healthy vs. Unhealthy Feelings   Jun GARCIA documentary     Meditation/Mindfulness  Weekly      Overconfidence/Complacency       Resentments       Stress

## 2021-06-13 NOTE — PROGRESS NOTES
Weekly Progress Note  Tyler Ramsey  1985  [id number]      D) Pt attended 4 groups this week with 0 absences. Patient attended 0 individual sessions this week. A) Staff facilitated groups and reviewed tx progress. Assessed for [facility]. R) No VAP needed at this time. Pt working on the following dimensions:    Dimension #1 - Withdrawal Potential - Risk 0.  Patients UA results were negative for the 1st time since beginning treatment.   Specific goals from treatment plan addressed this week:  -Remain abstinent  from all chemicals.   Effectiveness of strategies:  -Effective:The patient reports no use and UA results from 10/18/17 were negative from all substances.     Dimension #2 - Biomedical - Risk 0.  No emergent medical concerns.   Specific goals from treatment plan addressed this week:  -Establish primary care provider  Effectiveness of strategies:  -Not effective: The patient has yet to establish a primary care provider, he will be given information on providers in the area.     Dimension #3 - Emotional/Behavioral/Cognitive - Risk 1. Client has some impulse control issues .  Specific goals from treatment plan addressed this week:   -Mental health assessment 10/27/17  Effectiveness of strategies:  -Effective, client is prepared for evaluation, completed paperwork    Dimension #4 - Treatment Acceptance/Resistance - Risk 1. Client is on a behavior contract,for attendance and use.  Specific goals from treatment plan addressed this week:  -Present to group on time  -Maintain treatment contract   Effectiveness of strategies:  -Effective, client has been in group on time each day  -The patient has maintained treatment contract, he has had his 1st clean UA results as well as good attendance.     Dimension #5 - Relapse Potential - Risk 3.Client remains vulnerable to continued use,   Specific goals from treatment plan addressed this week:  -Develop coping stradegies to triggers  Effectiveness of strategies:  -Effective, client  reports avoiding friends to go out, working out.     Dimension #6 - Recovery Environment - Risk 3.   Specific goals from treatment plan addressed this week:  Go to 2 meetings a week  Effectiveness of strategies:  Ineffective. Client continues to report 1 sober support meeting, no sponsor at this time.   T) Treatment plan updated no.  Patient notified and in agreement NA.  Patient educated on Feelings. Patient has completed 99of 84 program hours at this time. Projected discharge date is TBD. Current discharge plan is Relapse Prevention.     REG Ernnadez  10/20/2017, 9:47 AM        Psycho-Educational Curriculum  Date Attended  Psycho-Educational Curriculum  Date Attended    Acceptance  9/11 Shame/Guilt      1st Step    Anger/Rage   10/12/17   Affirmations    Mental Health  8/28   Cost of addiction, self acceptance, serenity  9/12 Mental health research project 8/29   Sheila Story and DSM 5  9/13 Victim Mentality  8/30   Automatic Negative Thoughts    Anxiety      Cross Addiction    Co-Occurring Disorders      Stages of Change    Carolyn/Bipolar      Relapse    Trauma         Anger & Overall mental health  10/16     Protective factors  10/17     Mental health bingo  10/18     Mental health  10/19   Addictive Thoughts    Victim Identity      Coping Skills    Sober Structure  9/6   Relapse Prevention    Continuum of Care      Roadblocks to healthy thinking 9/18       Balance & Anonymous people  9/19       Emotional Regulation  9/20       Thought Processes  9/21           Maslow Hierarchy of Needs 9/7   Medical Aspects    Non-12 Step Support      Brain/Neurotransmitters    Priorities  9/5   Medication Compliance    Spirituality  9/6   ADELE Alcohol/Drug PPT  9/26 Weekend Planner      What drugs do in your body video  9/25       Medical Aspects Research Project  9/27       Medical Aspects Jeopardy  9/28       Physical Health    Educational Videos      Post Acute Withdrawal    1st Step      Pregnancy and Drug Use   "  2nd Step      Sexual Health    Assertive Communication      Short-Term/Long-Term Effects    My name is Jun GARCIA     Relationships    Cross Addiction      Love languages  8/17       Listening Skills  8/14       Assertive Communication  10/3 God As We Understood Him      Boundaries & Happiness  8/16 HBO Relapse      Codependence   10/4 HBO What Is Addiction      Defense Mechanisms     Medical Aspects 1      Family Roles & Genograms  8/15 Medical Aspects 2      Communication styles  10/2       Spirituality  10/4       Healthy Relationships  10/5       Goodbye Letter    National Geographic: Stress      Intimacy    PBS Depression Out of the Shadows      Needs/Dealbreakers in Relationships    The Anonymous People     Socialization Skills    [place]      Feelings  8/21 Julian Jean \"Highjacked Brain\"    Vulnerability  8/22       Emotional Regulation & Self Acceptance  8/23       Emma  8/24       ABC Model of Emotion    Rambo Stone Humor in [place]x     Grief and Loss    The Mindfulness Movie     Healthy vs. Unhealthy Feelings   10/9/17 Jun GARCIA documentary      Meditation/Mindfulness  Weekly        Overconfidence/Complacency          Resentments          Stress              "

## 2021-06-13 NOTE — PROGRESS NOTES
Tyler Ramsey attended 3 hours of group therapy today.Late entry for 10/16/17    10/17/2017 2:55 PM Trini Walker
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
## 2021-06-15 NOTE — PROGRESS NOTES
Weekly Progress Note  Patient Name: Tyler Ramsey   : 1985   MRN: [id number]    D) Patient s case was transferred from Relapse Prevention to Service Coordination this week on 2018.  Patient attended  groups this week with no absences. Patient had one individual sessions this week. A) Staff facilitated groups and reviewed tx progress. Assessed for [facility]. R) No VAP needed at this time.   Any significant events, defines as events that impact patient s relationship with others inside and outside of treatment: no   Indicate any changes or monitoring of physical or mental health problems: no     Indicate involvement by any outside supports: 12 Step meeting at [facility] reviewed and modified as needed. N/A   Pt working on the following dimensions:  Dimension #1 - Withdrawal Potential - Risk 0.  Last use of marijuana reported s 2/3/2018.  UA on 18 was negative.  Pt reports no concern for withdrawal this week.    Specific goals from treatment plan addressed this week: maintain sobriety.   Effectiveness of strategies: UA was clean this week.      Dimension #2 - Biomedical - Risk 0. No medical issues reported by patient, however patient admits he is still smokes 3-4 cigars ad week.  Pt reports his cigar use decreased.    Specific goals from treatment plan addressed this week: Pt reports he goes to [employer] to exercise.  Pt will benefit from setting up a primary care and start annual physical to maintain his future health.    Effectiveness of strategies: Discuss primary care setting with patient     Dimension #3 - Emotional/Behavioral/Cognitive - Risk 1.  The patient has no formal diagnoses of mental illnesses. The patient does struggle with literacy and needs help with all reading, writing, and spelling. Patient graduated from high school despite his learning disability.      Specific goals from treatment plan addressed this week: Patient identifies one of his goal is to  his reading  skills.  Pt seems he worries other people would know he is illiterate.    Effectiveness of strategies: Patient will participate in groups by listening.  Find agency to help out with literacy.      Dimension #4 - Treatment Acceptance/Resistance - Risk 1.  [facility] Probation mandated to finish treatment. PO, Shayla Addison.    Specific goals from treatment plan addressed this week: Patient seems accepting his probation order and verbalizes his desire to quit.    Effectiveness of strategies: Patient was sober this week.      Dimension #5 - Relapse Potential - Risk 2. Most recent heavy use pattern-2 to 3 blunts every other day.  Last use on 2/3/18.  Pt seems he need more education to identify internal/external triggers.    Specific goals from treatment plan addressed this week: to complete the transfer and start Service Coordination group.  Patient attended his first group and seems to be motivated.    Effectiveness of strategies: Patient will participate by listening for the first couple times to reduce his anxiety.  Pt was able to participate in group check in with the help from counselor reading the material.      Dimension #6 - Recovery Environment - Risk 2.  Patient is on social security disability and receives $703/month. 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  The record shows patient goes to 12 Step meeting in [place]. Patient is single, no children and states he would like to stay that way to focus on his own life for now. Pt states he has a supportive family.    Specific goals from treatment plan addressed this week: Start Service Coordination group   Effectiveness of strategies: Patient started service coordination group    T) Treatment plan updated: N/A. Patient notified and in agreement: Yes   Patient educated on Social Skills. Patient has completed 4 of 220 program hours at this time. Projected discharge date is 5/8/2018. Current discharge plan is TBD.   "    Martha Heredia [facility]  Date/Time 2/9/2018  12 pm     Psycho-Educational Curriculum  Date Attended  Psycho-Educational Curriculum  Date Attended    Acceptance   Shame/Guilt     1st Step   Anger/Rage     Affirmations   Mental Health     Automatic Negative Thoughts   Anxiety     Cross Addiction   Co-Occurring Disorders     Stages of Change   Carolyn/Bipolar     Relapse   Trauma      Addictive Thoughts   Victim Identity     Coping Skills   Sober Structure     Relapse Prevention   Continuum of Care     Medical Aspects   Non-12 Step Support     Brain/Neurotransmitters   Priorities     Medication Compliance   Spirituality     ADELE Alcohol/Drug Research   Weekend Planner     Physical Health   Educational Videos     Post Acute Withdrawal   1st Step     Pregnancy and Drug Use   2nd Step     Sexual Health   Assertive Communication     Short-Term/Long-Term Effects   My name is Jun GARCIA    Relationships   Cross Addiction     Assertive Communication   God As We Understood Him     Boundaries   HBO Relapse     Codependence    HBO What Is Addiction     Defense Mechanisms    Medical Aspects 1     Family Roles   Medical Aspects 2     Goodbye Letter   [employer]: Stress     Intimacy   PBS Depression Out of the Shadows     Needs/Dealbreakers in Relationships   The Anonymous People    Socialization Skills  2/8/18  [place]     Feelings   Julian Jean \"Highjacked Brain\"    ABC Model of Emotion   Rambo Stone Humor in [place]    Grief and Loss   The Mindfulness Movie    Healthy vs. Unhealthy Feelings   Jun GARCIA documentary     Meditation/Mindfulness       Overconfidence/Complacency       Resentments       Stress           " none
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
## 2021-06-15 NOTE — PROGRESS NOTES
Tyler Ramsey attended 2 hours of group therapy today.  Ct asked for primary Counselor's card to make an appointment with PO and LADC regarding recent relapse.  Ct stated that he did not use this week.  Ct participated in discussion on finding calm in a chaotic world.    2/1/2018 1:35 PM Dana Hutchison

## 2021-06-15 NOTE — PROGRESS NOTES
Individual Treatment Plan    Patient  Name: Tyler Ramsey  MRN: [id number]   : 1985  Admit Date: 2018  Date of Initial Service Plan: 2017   Tentative Discharge Date: 2018     Counselor: REG Nathan      Dimension 1: Acute Intoxication/Withdrawal Potential, Risk level: 0    Problem: Last use of marijuana 2/3/2018. Patient does not report any concern related to withdrawals.    Goal: Maintain sobriety   Must be reached to complete treatment? Yes   Methods/Strategies (must include amount and frequency): Patient will comply with weekly UA when he comes to Service [facility] groups   Target Date: 2018   Completion Date:         Dimension 2: Biomedical Conditions/Complications, Risk level: 1    Problem: Patient reports no major health concern at this time but he is still smoking 4-5 cigars per week.    Goal: learn about nicotine health impact and to reduce the use  Must be reached to complete treatment? Yes   Methods/Strategies (must include amount and frequency): Pt will participate in nurse health education and CD groups to learn about nicotine's health effect.    Target Date: 2018   Completion Date:     Problem: Patient does not have primary care doctor   Goal: to manage health by establishing relationship with primary care doctor   Must be reached to complete treatment? Yes  Methods/Strategies (must include amount and frequency): I will establish a primary care before discharge from treatment.    Target Date: 2018   Completion Date:       Dimension 3: Emotional/Behavioral/Cognitive, Risk level: 1    Problem: No formal diagnoses of mental illnesses.  Patient seems selections about being illiterate.  Pt states he would like to  on his reading skills.    Goal: To gain reading skills to enhance self esteem   Must be reached to complete treatment? Yes  Methods/Strategies (must include amount and frequency):  I will complete reading evaluation at [facility] for [facility]  Learning so I can get into free adult reading class.   Walk-ins on Mondays and Wednesdays 9-11:30 am or 2-5:30pm.  After the evaluation, I will meet with an advisor and complete 3 hour orientation on Thursday 12:30pm, 5 pm or Friday 9 am.  Available class times: Morning class, M-F, 8:15-10:45 am or 10:45am to 1:15pm.    Address: [address] [place] [address].  [phone].    Target Date: 5/8/2018   Completion Date:     Problem: Patient has history of smoking marijuana despite being on probation.    Goal: To learn to live sober life style   Must be reached to complete treatment? Yes  Methods/Strategies (must include amount and frequency): I will gain more addiction knowledge by attending CD groups at [facility].  Tuesdays and Thursdays 12:30-3:30pm, Room G 725.    Target Date: 5/8/2018  Completion Date:        Dimension 4: Readiness to Change, Risk level 1    Problem: Pt is on probation and mandated to complete treatment    Goal: comply with probation and not get violation   Must be reached to complete treatment? Yes  Methods/Strategies (must include amount and frequency):  I will maintain contact with my  Shayla Addison at [phone].      Target Date: 5/8/2018   Completion Date:         Dimension 5: Relapse/Continued Use/Continued Problem Potential, Risk level: 2    Problem: Pt reports his last use was 2/3/2018.  The record shows he was smoking 2-3 blunts every other day before starting treatment. Pt initially started Day Outpatient on 8/8/2017, then he started Relapse Prevention on 11/16/2017.  Patient seems he needed more structure and switched to [facility] on 2/8/2018.    Goal: be able to find meaning in sobriety and gain foundation for life long sobriety.    Must be reached to complete treatment? Yes  Methods/Strategies (must include amount and frequency): I will identify 3 things I can enjoy in life that require absolute sobriety.  I will share this in  group.    Target Date:  5/8/2018   Completion Date:          Dimension 6: Recovery Environment, Risk level: 2    Problem: Patient is on social security disability and lives in a subsidized housing.  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    Goal: gain structure in my day and be constructive   Must be reached to complete treatment? Yes  Methods/Strategies (must include amount and frequency):  I will attend at least 2 Twelve Step meetings per week at [place].  I will apply for telephone  who can call me weekly to check on my life and sobriety.    Target Date: 5/8/2017   Completion Date:     Problem: Lack of structure and resources to maintain healthy life style   Goal:  Gain structure and sober support resources in community   Must be reached to complete treatment? Yes  Methods/Strategies (must include amount and frequency):  I will participate in recreational/sober group at service coordination.  Last Wednesday of the month (2/28, 3/28, 4/25), 11:30am - 1:30 pm.     Target Date: 5/8/2018   Completion Date:         Resources  Resources to which the patient is being referred for problems when problems are to be addressed concurrently by another provider: [facility]       By signing this document, I am acknowledging that I was actively and directly involved in the development of my treatment plan.           Patient  Signature_________________________________________         Date_____2/9/2018 _____________        Staff Signature  REG Nathan    Date: 2/9/2018 2:10 pm
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
## 2021-06-15 NOTE — PROGRESS NOTES
D)Referral to [facility] received   A) Contacted patient and scheduled 1:1 for Thursday 2/8 at 11:30 am  T)Patient will start group on that day as well.

## 2021-06-15 NOTE — PROGRESS NOTES
D) Patient attended 3 hours of CD Outpatient/Service Coordination group today.   A) This was patient's first group attendance.  Patient was introduced to group rules, structure and members.
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
## 2021-06-16 NOTE — PROGRESS NOTES
Weekly Progress Note  Patient Name: Tyler Ramsey   : 1985   MRN: [id number]    D) Patient s case was transferred from Relapse Prevention to Service Coordination on 2018.  Patient attended 2/3 groups this week with one absence. Patient had no individual sessions this week. A) Staff facilitated groups and reviewed tx progress. Assessed for [facility]. R) No VAP needed at this time.   Any significant events, defines as events that impact patient s relationship with others inside and outside of treatment: no   Indicate any changes or monitoring of physical or mental health problems: no     Indicate involvement by any outside supports: 12 Step meeting at [facility] reviewed and modified as needed. N/A   Pt working on the following dimensions:  Dimension #1 - Withdrawal Potential - Risk 0.  Last use of marijuana reported s 2/3/2018.  2 UAs this week and both clean.   Pt reports no concern for withdrawal this week.    Specific goals from treatment plan addressed this week: maintain sobriety.   Effectiveness of strategies: UA was clean this week.      Dimension #2 - Biomedical - Risk 0. No medical issues reported by patient, however patient admits he is still smokes 3-4 cigars ad week.  Pt reports his cigar use decreased.    Specific goals from treatment plan addressed this week: Pt reports he goes to [employer] to exercise.  Pt will benefit from setting up a primary care and start annual physical to maintain his future health.  Patient missed his primary appointment due to over sleeping.  Appointment was rescheduled for 3/1.   Patient made to his primary care appointment and  discussed his desire for nicotine cessation with Dr. Roberto Gunter.    Effectiveness of strategies: Effective.  Patient established primary care with Dr. Roberto Gunter.     Dimension #3 - Emotional/Behavioral/Cognitive - Risk 1.  The patient has no formal diagnoses of mental illnesses.  Pt said he did not complete the reading  assessment at the [facility] yet.  Patient set his goal as to get his reading assessment done by the end of next week.  Patient seems emotionally stable and able to articulate well in group.    Specific goals from treatment plan addressed this week: Patient identifies one of his goal is to  his reading skills. Patient is recommended to get his reading assessment done at [facility].   Effectiveness of strategies: Patient has been able to participate very well in group.  Pt states he plans to get his reading assessment done by next week.       Dimension #4 - Treatment Acceptance/Resistance - Risk 1.  [facility] PO, Sh[facility]la Add[facility].    Specific goals from treatment plan addressed this week: Patient seems accepting his probation order and verbalizes his desire to quit.   Patient attended both groups this week and UAs are clean.    Effectiveness of strategies: Effective.  Patient was sober this week as evidenced by two UA results       Dimension #5 - Relapse Potential - Risk 2. Last use 2/3/18. Both UAs were clean this week.  Patient is able to participate well in group and articulare his thoughts. Pt seems to be able to makes rational and healthy decisions to stay sober.  Specific goals from treatment plan addressed this week: Get reading assessment done at [facility] to get into a reading class.  Patient seems very humble and open to learning.    Effectiveness of strategies: pending.  Patient is opening up more in group and seems to be trusting the group more.      Dimension #6 - Recovery Environment - Risk 2.  Patient is on social security disability and receives $703/month. 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  The record shows patient goes to 12 Step meeting in [place]. Patient is single, no children and states he would like to stay that way to focus on his own life for now. Pt states he has an extensive supportive  family.  Patient reports his old using friends are accepting his sobriety and not bothering him.  Patient states he is telling everyone that he is no longer using and setting boundaries.      Specific goals from treatment plan addressed this week:  Develop and maintain sober support network.    Effectiveness of strategies: Patient seems he is learning healthy way to set boundaries with others and working on self care.       T) Treatment plan updated: N/A. Patient notified and in agreement: Yes   Patient educated on Defense Mechanism.  Patient has completed 28 of 220 program hours at this time. Projected discharge date is 5/8/2018. Current discharge plan is TBD.      REG Nathan  3/11/2018  8am     Psycho-Educational Curriculum  Date Attended  Psycho-Educational Curriculum  Date Attended    Acceptance   Shame/Guilt     1st Step   Anger/Rage     Affirmations   Mental Health     Automatic Negative Thoughts   Anxiety     Cross Addiction  2/20, 2/22  Co-Occurring Disorders     Stages of Change   Carolyn/Bipolar     Relapse   Trauma   2/14, 2/15    Addictive Thoughts   Victim Identity     Coping Skills   Sober Structure     Relapse Prevention   Continuum of Care     Medical Aspects   Non-12 Step Support     Brain/Neurotransmitters   Priorities     Medication Compliance   Spirituality  2/27, 3/1    ADELE Alcohol/Drug Research   Weekend Planner     Physical Health   Educational Videos     Post Acute Withdrawal   1st Step     Pregnancy and Drug Use   2nd Step     Sexual Health   Assertive Communication     Short-Term/Long-Term Effects   My name is Jun ARGUETAJodi Nicholson   Cross Addiction     Assertive Communication   God As We Understood Him     Boundaries   HBO Relapse     Codependence    HBO What Is Addiction     Defense Mechanisms  3/6/18, 3/8/18 Medical Aspects 1     Family Roles   Medical Aspects 2     Goodbye Letter   National Geographic: Stress     Intimacy   PBS Depression Out of the Shadows    "  Needs/Dealbreakers in Relationships   The VODECLIC    Socialization Skills  2/8/18  [place]     Feelings   Julian Jean \"Highjacked Brain\"    ABC Model of Emotion   Rambo Stone Humor in Tx    Grief and Loss   The Mindfulness Movie    Healthy vs. Unhealthy Feelings   Jun GARCIA documentary     Meditation/Mindfulness       Overconfidence/Complacency       Resentments       Stress           "

## 2021-06-16 NOTE — PROGRESS NOTES
Weekly Progress Note  Patient Name: Tyler Ramsey   : 1985   MRN: [id number]    D) Patient attended 2/2 groups this week with one absence. Patient had no individual sessions this week. A) Staff facilitated groups and reviewed tx progress. [facility] for [facility]. R) No VAP needed at this time.   Any significant events, defines as events that impact patient s relationship with others inside and outside of treatment: no   Indicate any changes or monitoring of physical or mental health problems: no     Indicate involvement by any outside supports: 12 Step meeting at [facility] reviewed and modified as needed. N/A   Pt working on the following dimensions:  Dimension #1 - Withdrawal Potential - Risk 0.  Last use of marijuana reported s 2/3/2018.  2 UAs this week and both clean.   Pt reports no concern for withdrawal this week.    Specific goals from treatment plan addressed this week: maintain sobriety.   Effectiveness of strategies: UA was clean this week.      Dimension #2 - Biomedical - Risk 0. No medical issues reported by patient, however patient admits he is still smokes 3-4 cigars ad week.  Pt reports he is working toward reducing cigar use.    Specific goals from treatment plan addressed this week: Pt reports he goes to [employer] to exercise.  Primary care established at [facility] with Dr. Roberto Gunter.    Effectiveness of strategies: Effective.  Patient has health insurance to access medical care if necessary.  Patient is not reporting any major medical issues at this time.      Dimension #3 - Emotional/Behavioral/Cognitive - Risk 1.  The patient has no formal diagnoses of mental illnesses.  Patient does not hesitate to share that he has reading problem.  Pt shares in group that his goal is to learn to be more efficient in reading and own a business someday. Pt shares his insight in group and seems to be understanding the group despite his reading issues.  Patient frequently states he  is proud of graduating from high school and wants to pursuit something in his life.    Specific goals from treatment plan addressed this week: Patient identifies one of his goal is to  his reading skills. Patient is recommended to get his reading assessment done at [facility].   Effectiveness of strategies: Patient has been able to participate very well in group.  Pt states he plans to get his reading assessment done by next week.       Dimension #4 - Treatment Acceptance/Resistance - Risk 1.  [facility] PO, Sh[facility]la Addison.    Specific goals from treatment plan addressed this week: Patient seems accepting his probation order and verbalizes his desire to quit.   Patient attended both groups this week and UAs are clean.    Effectiveness of strategies: Effective.  Patient was sober this week as evidenced by two UA results       Dimension #5 - Relapse Potential - Risk 2. Last use 2/3/18. Both UAs were clean this week.  Patient listens well in group and participate by listening to others.  When he speaks, patient is showing evidence that he is processing everything discussed in group. Pt seems to gaining more understanding on what his triggers are and trying to develop skills to stop relapse.    Specific goals from treatment plan addressed this week: Get reading assessment done at [facility] to get into a reading class.  Patient seems very humble and open to learning.    Effectiveness of strategies: Pending.  Patient shares in group that his goal is to be able to read better.      Dimension #6 - Recovery Environment - Risk 2.  Patient is on social security disability and receives $703/month. 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  The record shows patient goes to 12 Step meeting in [place]. Patient is single, no children and states he would like to stay that way to focus on his own life for now. Pt states he has an  extensive supportive family.  Patient reports his old using friends are accepting his sobriety and not bothering him.  Patient states he is telling everyone that he is no longer using and setting boundaries.      Specific goals from treatment plan addressed this week:  Develop and maintain sober support network.    Effectiveness of strategies: Patient seems he is learning healthy way to set boundaries with others and working on self care.       T) Treatment plan updated: N/A. Patient notified and in agreement: Yes   Patient educated on Addictive Thoughts.  Patient has completed 34 of 220 program hours at this time. Projected discharge date is 5/8/2018. Current discharge plan is TBD.      REG Nathan      Psycho-Educational Curriculum  Date Attended  Psycho-Educational Curriculum  Date Attended    Acceptance   Shame/Guilt     1st Step   Anger/Rage     Affirmations   Mental Health     Automatic Negative Thoughts   Anxiety     Cross Addiction  2/20, 2/22  Co-Occurring Disorders     Stages of Change   Carolyn/Bipolar     Relapse   Trauma   2/14, 2/15    Addictive Thoughts  3/13. 3/15  Victim Identity     Coping Skills   Sober Structure     Relapse Prevention   Continuum of Care     Medical Aspects   Non-12 Step Support     Brain/Neurotransmitters   Priorities     Medication Compliance   Spirituality  2/27, 3/1    ADELE Alcohol/Drug Research   Weekend Planner     Physical Health   Educational Videos     Post Acute Withdrawal   1st Step     Pregnancy and Drug Use   2nd Step     Sexual Health   Assertive Communication     Short-Term/Long-Term Effects   My name is Jun GARCIA    Relationships   Cross Addiction     Assertive Communication   God As We Understood Him     Boundaries   HBO Relapse     Codependence    HBO What Is Addiction     Defense Mechanisms  3/6/18, 3/8/18 Medical Aspects 1     Family Roles   Medical Aspects 2     Goodbye Letter   National Geographic: Stress     Intimacy   PBS Depression Out of the  "Shadows     Needs/Dealbreakers in Relationships   The TrackR    Socialization Skills  2/8/18  [place]     Feelings   Julian Jean \"Highjacked Brain\"    ABC Model of Emotion   Rambo Stone Humor in Tx    Grief and Loss   The Mindfulness Movie    Healthy vs. Unhealthy Feelings   Jun GARCIA documentary     Meditation/Mindfulness       Overconfidence/Complacency       Resentments       Stress           "

## 2021-06-16 NOTE — PROGRESS NOTES
D) Patient attended 3 hours of CD Outpatient/Service Coordination Group today.   A) This writer met with patient during the break and went over the master treatment plan.  Pt in agreement with the plan, signed and received copy.

## 2021-06-16 NOTE — PROGRESS NOTES
D) Patient attended 3 hours of CD Outpatient/Service Coordination group today.   A) Patient was reminded of Monday's appointment for primary care establishment.  Pt said he knows where the clinic is located.

## 2021-06-16 NOTE — PROGRESS NOTES
Weekly Progress Note  Patient Name: Tyler Ramsey   : 1985   MRN: [id number]    D) Patient s case was transferred from Relapse Prevention to Service Coordination on 2018.  Patient attended 2/3 groups this week with one absence. Patient had no individual sessions this week. A) Staff facilitated groups and reviewed tx progress. Assessed for [facility]. R) No VAP needed at this time.   Any significant events, defines as events that impact patient s relationship with others inside and outside of treatment: no   Indicate any changes or monitoring of physical or mental health problems: no     Indicate involvement by any outside supports: 12 Step meeting at [facility] reviewed and modified as needed. N/A   Pt working on the following dimensions:  Dimension #1 - Withdrawal Potential - Risk 0.  Last use of marijuana reported s 2/3/2018.  2 UAs this week and both clean.   Pt reports no concern for withdrawal this week.    Specific goals from treatment plan addressed this week: maintain sobriety.   Effectiveness of strategies: UA was clean this week.      Dimension #2 - Biomedical - Risk 0. No medical issues reported by patient, however patient admits he is still smokes 3-4 cigars ad week.  Pt reports his cigar use decreased.    Specific goals from treatment plan addressed this week: Pt reports he goes to [employer] to exercise.  Pt will benefit from setting up a primary care and start annual physical to maintain his future health.  Patient missed his primary appointment due to over sleeping.  Appointment was rescheduled for 3/1.   Patient made to his primary care appointment and  discussed his desire for nicotine cessation with Dr. Roberto Gunter.    Effectiveness of strategies: Effective.  Patient established primary care with Dr. Roberto Gunter.     Dimension #3 - Emotional/Behavioral/Cognitive - Risk 1.  The patient has no formal diagnoses of mental illnesses. Patient is illiterate and wanting to gain  "education. Pt reports this week that he plans to get the reading assessment done at [facility] by next week.  Patient seems very intelligent and able to participate very well in group by sharing his opinions and thoughts.    Specific goals from treatment plan addressed this week: Patient identifies one of his goal is to  his reading skills. Patient is recommended to get his reading assessment done at [facility].   Effectiveness of strategies: Patient has been able to participate very well in group.  Pt states he plans to get his reading assessment done by next week.       Dimension #4 - Treatment Acceptance/Resistance - Risk 1.  [facility] Probation mandated to finish treatment. PO, Shayla Addison.    Specific goals from treatment plan addressed this week: Patient seems accepting his probation order and verbalizes his desire to quit.   Patient attended both groups this week and seems getting used to group other mebmers.    Effectiveness of strategies: Patient was sober this week as evidenced by two UA results       Dimension #5 - Relapse Potential - Risk 2. Last use 2/3/18. Both UAs were clean this week.  Pt seems very genuine about quitting.  When group discussed \"things we missed out due to addiction\", patient shared that \"I could have completed more if I didn't use. I could have gotten job and not waste money.\"   Patient shared he has been using since age 9 and went to intermediate every year growing up.  Pt said he currently has the longest record of not going to intermediate for 2 years.   Pt identifeis his future goal is to open a small business.    Specific goals from treatment plan addressed this week: Get reading assessment done at [facility] to get into a reading class.  Patient seems very humble and open to learning.    Effectiveness of strategies: pending.  Patient is opening up more in group and seems to be trusting the group more.      Dimension #6 - Recovery Environment - Risk 2.  Patient is on social " security disability and receives $703/month. 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  The record shows patient goes to 12 Step meeting in [place]. Patient is single, no children and states he would like to stay that way to focus on his own life for now. Pt states he has an extensive supportive family.  Patient reports his old using friends are accepting his sobriety and not bothering him.  Patient states he is telling everyone that he is no longer using and setting boundaries.      Specific goals from treatment plan addressed this week:  Develop and maintain sober support network.    Effectiveness of strategies: Patient seems he is learning healthy way to set boundaries with others and working on self care.       T) Treatment plan updated: N/A. Patient notified and in agreement: Yes   Patient educated on Spirituality.  Patient has completed 22 of 220 program hours at this time. Projected discharge date is 5/8/2018. Current discharge plan is TBD.      REG Nathan  3/2/2018  8am     Psycho-Educational Curriculum  Date Attended  Psycho-Educational Curriculum  Date Attended    Acceptance   Shame/Guilt     1st Step   Anger/Rage     Affirmations   Mental Health     Automatic Negative Thoughts   Anxiety     Cross Addiction  2/20, 2/22  Co-Occurring Disorders     Stages of Change   Carolyn/Bipolar     Relapse   Trauma   2/14, 2/15    Addictive Thoughts   Victim Identity     Coping Skills   Sober Structure     Relapse Prevention   Continuum of Care     Medical Aspects   Non-12 Step Support     Brain/Neurotransmitters   Priorities     Medication Compliance   Spirituality  2/27, 3/1    ADELE Alcohol/Drug Research   Weekend Planner     Physical Health   Educational Videos     Post Acute Withdrawal   1st Step     Pregnancy and Drug Use   2nd Step     Sexual Health   Assertive Communication     Short-Term/Long-Term Effects   My name is Jun Marquez  "Addiction     Assertive Communication   God As We Understood Him     Boundaries   HBO Relapse     Codependence    HBO What Is Addiction     Defense Mechanisms    Medical Aspects 1     Family Roles   Medical Aspects 2     Goodbye Letter   National Geographic: Stress     Intimacy   PBS Depression Out of the Shadows     Needs/Dealbreakers in Relationships   The Anonymous People    Socialization Skills  2/8/18  [place]     Feelings   Julian Jean \"Highjacked Brain\"    ABC Model of Emotion   Rambo Stone Humor in Tx    Grief and Loss   The Mindfulness Movie    Healthy vs. Unhealthy Feelings   Jun GARCIA documentary     Meditation/Mindfulness       Overconfidence/Complacency       Resentments       Stress           "

## 2021-06-16 NOTE — PROGRESS NOTES
Weekly Progress Note  Patient Name: Tyler Ramsey   : 1985   MRN: [id number]    D) Patient s case was transferred from Relapse Prevention to Service Coordination this week on 2018.  Patient attended 2/2 groups this week with no absences. Patient had one individual sessions this week. A) Staff facilitated groups and reviewed tx progress. Assessed for [facility]. R) No VAP needed at this time.   Any significant events, defines as events that impact patient s relationship with others inside and outside of treatment: no   Indicate any changes or monitoring of physical or mental health problems: no     Indicate involvement by any outside supports: 12 Step meeting at [facility] reviewed and modified as needed. N/A   Pt working on the following dimensions:  Dimension #1 - Withdrawal Potential - Risk 0.  Last use of marijuana reported s 2/3/2018.  2 UAs this week and both clean.   Pt reports no concern for withdrawal this week.    Specific goals from treatment plan addressed this week: maintain sobriety.   Effectiveness of strategies: UA was clean this week.      Dimension #2 - Biomedical - Risk 0. No medical issues reported by patient, however patient admits he is still smokes 3-4 cigars ad week.  Pt reports his cigar use decreased.    Specific goals from treatment plan addressed this week: Pt reports he goes to [employer] to exercise.  Pt will benefit from setting up a primary care and start annual physical to maintain his future health.    Effectiveness of strategies: Primary Care appointment scheduled for  at [facility].      Dimension #3 - Emotional/Behavioral/Cognitive - Risk 1.  The patient has no formal diagnoses of mental illnesses. Patient is illiterate and wanting to gain education. Pt is in agreement to get a reading assessment done at [facility] so he can be assigned for appropriate level of reading class.  Patient is being assisted with his reading in group and fully  able to participate   Specific goals from treatment plan addressed this week: Patient identifies one of his goal is to  his reading skills. Patient is recommended to get his reading assessment done at [facility].    Effectiveness of strategies: Patient has been able to participate in groups with the help from the counselor reading the materials reading aloud.  Pt is able to participate and group discussions.      Dimension #4 - Treatment Acceptance/Resistance - Risk 1.  [facility] PO, Shayla Addison.    Specific goals from treatment plan addressed this week: Patient seems accepting his probation order and verbalizes his desire to quit.    Effectiveness of strategies: Patient was sober this week.      Dimension #5 - Relapse Potential - Risk 2. Last use 2/3/18. Both UAs were clean this week.  Pt seems very genuine about quitting.    Specific goals from treatment plan addressed this week: Lack of self esteem related being an illiterate.  Patient seems he is good at masking his reading skills but wanting to learn.    Effectiveness of strategies: Patient is able to give feedback in group. Pt seems he is getting used to the group this week.      Dimension #6 - Recovery Environment - Risk 2.  Patient is on social security disability and receives $703/month. 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  The record shows patient goes to 12 Step meeting in [place]. Patient is single, no children and states he would like to stay that way to focus on his own life for now. Pt states he has a supportive family.    Specific goals from treatment plan addressed this week:  Maintain structure by attending Start Service Coordination group   Effectiveness of strategies: Patient states coming to group is helping him to stay sober.       T) Treatment plan updated: N/A. Patient notified and in agreement: Yes   Patient educated on Trauma.  Patient  "has completed 10 of 220 program hours at this time. Projected discharge date is 5/8/2018. Current discharge plan is TBD.      REG Nathan  2/16/2018  12 pm     Psycho-Educational Curriculum  Date Attended  Psycho-Educational Curriculum  Date Attended    Acceptance   Shame/Guilt     1st Step   Anger/Rage     Affirmations   Mental Health     Automatic Negative Thoughts   Anxiety     Cross Addiction   Co-Occurring Disorders     Stages of Change   Carolyn/Bipolar     Relapse   Trauma   2/14, 2/15    Addictive Thoughts   Victim Identity     Coping Skills   Sober Structure     Relapse Prevention   Continuum of Care     Medical Aspects   Non-12 Step Support     Brain/Neurotransmitters   Priorities     Medication Compliance   Spirituality     ADELE Alcohol/Drug Research   Weekend Planner     Physical Health   Educational Videos     Post Acute Withdrawal   1st Step     Pregnancy and Drug Use   2nd Step     Sexual Health   Assertive Communication     Short-Term/Long-Term Effects   My name is Jun GARCIA    Relationships   Cross Addiction     Assertive Communication   God As We Understood Him     Boundaries   HBO Relapse     Codependence    HBO What Is Addiction     Defense Mechanisms    Medical Aspects 1     Family Roles   Medical Aspects 2     Goodbye Letter   National Geographic: Stress     Intimacy   PBS Depression Out of the Shadows     Needs/Dealbreakers in Relationships   The Anonymous People    Socialization Skills  2/8/18  [place]     Feelings   Julian Jean \"Highjacked Brain\"    ABC Model of Emotion   Rambo Stone Humor in Tx    Grief and Loss   The Mindfulness Movie    Healthy vs. Unhealthy Feelings   Jun GARCIA documentary     Meditation/Mindfulness       Overconfidence/Complacency       Resentments       Stress           "

## 2021-06-16 NOTE — PROGRESS NOTES
[facility] Follow Up Note    Tyler Ramsey   32 y.o. male    Date of Visit: 3/1/2018    Chief Complaint   Patient presents with     Establish Care     would like to stop smoking     Subjective  This is the first visit to our office for this 32-year-old young man.  He comes in primarily to establish care and to discuss stopping smoking.  I have reviewed some previous notes from the mental health staff.  He has apparently had some issues with marijuana abuse and is currently in the process of getting off of this.  Otherwise the patient tells me that he has been in good health with no significant or chronic medical issues.  He is a little vague on how much he is smoking.  It sounds as though he rolls his own cigarettes so it is hard to know how much tobacco he is really using each day.  He comes in today and tells me that he really would like to stop smoking and wonders what the best method or approach would be.  He offers no physical complaints.  He is not on any medication on a regular basis.  No other current health problems.    Past medical history: His only prior surgery was on his jaw secondary to trauma.  No medical issues.  He tells me that he is allergic to ibuprofen.    Social history: Smoking history as noted.  He is not currently employed.    ROS A comprehensive review of systems was performed and was otherwise negative    Medications, allergies, and problem list were reviewed and updated    Exam  General Appearance:   On examination his blood pressure is 122/60.  Weight is 177 pounds and height is 69 inches.  BMI is 25.95.    Neck: Supple with no masses and no neck vein distention.  No thyroid enlargement.    Lungs: Clear.    Heart: Regular sinus rhythm with a rate of 70 and no ectopy.  I hear no gallops or murmurs.    No peripheral edema.    The patient is alert and oriented ×3.      Assessment/Plan  1. Encounter for smoking cessation counseling       Patient is here primarily to  discuss smoking cessation.  I suggested that we try him first on the nicotine patches.  He seems agreeable to that and so we will send in a prescription.    I explained to him that we would be happy to function as his primary care clinic.  I explained to him that should any medical issues arise he should call or make an appointment to see us.  I will follow-up as needed.      Roberto Gunter MD      No current outpatient prescriptions on file prior to visit.     No current facility-administered medications on file prior to visit.      Allergies   Allergen Reactions     Ibuprofen Itching and Nausea Only     Social History   Substance Use Topics     Smoking status: Current Every Day Smoker     Types: Cigarettes     Smokeless tobacco: Never Used     Alcohol use None

## 2021-06-16 NOTE — PROGRESS NOTES
D) Patient attended 3 hour of CD Outpatient/Service Coordination Group today.    A) Patient said he missed Monday's primary care appointment because he overslept   T) We called [facility] after the group and rescheduled for 3/1 at 3:40pm.

## 2021-06-16 NOTE — PROGRESS NOTES
D)Late Entry from yesterday 3/8/018  A) Patient attended 3 hours of CD Outpatient/Service Coordination group

## 2021-06-16 NOTE — PROGRESS NOTES
Weekly Progress Note  Patient Name: Tyler Ramsey   : 1985   MRN: [id number]    D) Patient attended 2/2 groups this week with no absence. Patient had no individual sessions this week. A) Staff facilitated groups and reviewed tx progress. Assessed for [facility]. R) No VAP needed at this time.   Any significant events, defines as events that impact patient s relationship with others inside and outside of treatment: no   Indicate any changes or monitoring of physical or mental health problems: no     Indicate involvement by any outside supports: 12 Step meeting [facility] reviewed and modified as needed. N/A   Pt working on the following dimensions:  Dimension #1 - Withdrawal Potential - Risk 0.  Last use of marijuana reported s 2/3/2018.  2 UAs this week and both clean.   Pt reports no concern for withdrawal this week.    Specific goals from treatment plan addressed this week: maintain sobriety.   Effectiveness of strategies: UA was clean this week.      Dimension #2 - Biomedical - Risk 0. No medical issues reported by patient, however patient admits he is still smokes 3-4 cigars ad week.  Pt reports he is working toward reducing cigar use.    Specific goals from treatment plan addressed this week: Pt reports he goes to [employer] to exercise.  Primary care established at [facility] with Dr. Roberto Gunter.    Effectiveness of strategies: Effective.  Patient has health insurance to access medical care if necessary.  Patient is not reporting any major medical issues at this time.      Dimension #3 - Emotional/Behavioral/Cognitive - Risk 1.  The patient has no formal diagnoses of mental illnesses.  Pt admitted he has been avoiding to take the reading assessment at  [facility].   We talked about goals and melgoza al responsibilities. Pt agreed that he would complete the assessment by the end of this week.  Patient has good insight into his life.  When we talked about healthy and unhealthy  relationship, patient said he learned lessons from the past and his boundaries are very high.  Pt said he lost several family members due to addiction and he wants to stay sober and not be like them.    Specific goals from treatment plan addressed this week: Patient identifies one of his goal is to  his reading skills. Patient is recommended to get his reading assessment done at [facility].   Effectiveness of strategies: Patient has been able to participate very well in group.  Pt states he plans to get his reading assessment done by this week.       Dimension #4 - Treatment Acceptance/Resistance - Risk 1.  [facility] PO, Shayla Addison.    Specific goals from treatment plan addressed this week: Patient seems accepting his probation order and verbalizes his desire to quit.   Patient attended both groups this week and UAs are clean.    Effectiveness of strategies: Effective.  Patient was sober this week as evidenced by two UA results       Dimension #5 - Relapse Potential - Risk 2. Last use 2/3/18. Both UAs were clean this week.  Patient has good insight into his addiction.  Pt seems very genuine about improving his life.  Pt agrees he would get the reading assessment done so he can achieve more in his life.    Specific goals from treatment plan addressed this week: Get reading assessment done at [facility] to get into a reading class.  Patient seems very humble and open to learning.    Effectiveness of strategies: Pending.  Patient shares in group that his goal is to be able to read better.      Dimension #6 - Recovery Environment - Risk 2.  Patient has stable housing and good family support.  Patient is on social security disability and receives $703/month. 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  The record shows patient goes to 12 Step meeting in [place]. Patient is single, no children and states he would like to  stay that way to focus on his own life for now. Pt states he has an extensive supportive family.  Patient reports his old using friends are accepting his sobriety and not bothering him.  Patient states he is telling everyone that he is no longer using and setting boundaries.    Specific goals from treatment plan addressed this week:  Develop and maintain sober support network.    Effectiveness of strategies: Patient seems he is learning healthy way to set boundaries with others and working on self care.       T) Treatment plan updated: N/A. Patient notified and in agreement: Yes   Patient educated on Relationship.  Patient has completed 40 of 220 program hours at this time. Projected discharge date is 5/8/2018. Current discharge plan is TBD.      REG Nathan      Psycho-Educational Curriculum  Date Attended  Psycho-Educational Curriculum  Date Attended    Acceptance   Shame/Guilt     1st Step   Anger/Rage     Affirmations   Mental Health     Automatic Negative Thoughts   Anxiety     Cross Addiction  2/20, 2/22  Co-Occurring Disorders     Stages of Change   Carolyn/Bipolar     Relapse   Trauma   2/14, 2/15    Addictive Thoughts  3/13. 3/15  Victim Identity     Coping Skills   Sober Structure     Relapse Prevention   Continuum of Care     Medical Aspects   Non-12 Step Support     Brain/Neurotransmitters   Priorities     Medication Compliance   Spirituality  2/27, 3/1    ADELE Alcohol/Drug Research   Weekend Planner     Physical Health   Educational Videos     Post Acute Withdrawal   1st Step     Pregnancy and Drug Use   2nd Step     Sexual Health   Assertive Communication     Short-Term/Long-Term Effects   My name is Jun GARCIA    Bharat   Cross Addiction     Assertive Communication  3/20, 3/22,  God As We Understood Him     Boundaries  3/20, 3/22  HBO Relapse     Codependence   3/20, 3/22  HBO What Is Addiction     Defense Mechanisms  3/6/18, 3/8/18 Medical Aspects 1     Family Roles   Medical Aspects 2   "   Goodbye Letter   National Geographic: Stress     Intimacy   PBS Depression Out of the Shadows     Needs/Dealbreakers in Relationships   The [employer]    Socialization Skills  2/8/18  [place]     Feelings   Julian Jean \"Highjacked Brain\"    ABC Model of Emotion   Rambo Stone Humor in Tx    Grief and Loss   The Mindfulness Movie    Healthy vs. Unhealthy Feelings   Jun GARCIA documentary     Meditation/Mindfulness       Overconfidence/Complacency       Resentments       Stress           "

## 2021-06-16 NOTE — PROGRESS NOTES
D) Called patient to see why he did not attend group yesterday  A) Pt said he was a little sick yesterday.    T) This writer reminded him of today's monthly recreational group. Pt said he would attend

## 2021-06-16 NOTE — PROGRESS NOTES
Weekly Progress Note  Patient Name: Tyler Ramsey   : 1985   MRN: [id number]    D) Patient s case was transferred from Relapse Prevention to Service Coordination this week on 2018.  Patient attended 2/2 groups this week with no absences. Patient had one individual sessions this week. A) Staff facilitated groups and reviewed tx progress. Assessed for [facility]. R) No VAP needed at this time.   Any significant events, defines as events that impact patient s relationship with others inside and outside of treatment: no   Indicate any changes or monitoring of physical or mental health problems: no     Indicate involvement by any outside supports: 12 Step meeting at [facility] reviewed and modified as needed. N/A   Pt working on the following dimensions:  Dimension #1 - Withdrawal Potential - Risk 0.  Last use of marijuana reported s 2/3/2018.  2 UAs this week and both clean.   Pt reports no concern for withdrawal this week.    Specific goals from treatment plan addressed this week: maintain sobriety.   Effectiveness of strategies: UA was clean this week.      Dimension #2 - Biomedical - Risk 0. No medical issues reported by patient, however patient admits he is still smokes 3-4 cigars ad week.  Pt reports his cigar use decreased.    Specific goals from treatment plan addressed this week: Pt reports he goes to [employer] to exercise.  Pt will benefit from setting up a primary care and start annual physical to maintain his future health.  Patient missed his primary appointment due to over sleeping.  It was rescheduled for 3/1.    Effectiveness of strategies: Primary Care appointment scheduled for Thursday 3/1 at [facility].      Dimension #3 - Emotional/Behavioral/Cognitive - Risk 1.  The patient has no formal diagnoses of mental illnesses. Patient is illiterate and wanting to gain education. Pt is in agreement to get a reading assessment done at [facility] so he can be assigned for  "appropriate level of reading class.  Patient listens well in group and able to give appropriate feedbacks. Patient described himself as \"I am a stand up person. I fight for what I want.  Patient recently purchased a car and working toward getting chin switched.  Patient talked his family being very close.    Specific goals from treatment plan addressed this week: Patient identifies one of his goal is to  his reading skills. Patient is recommended to get his reading assessment done at [facility].    Effectiveness of strategies: Patient has been able to participate in groups with the help from the counselor reading the materials reading aloud.  Pt is able to participate and group discussions.      Dimension #4 - Treatment Acceptance/Resistance - Risk 1.  [facility] PO, Shayla Addison.    Specific goals from treatment plan addressed this week: Patient seems accepting his probation order and verbalizes his desire to quit.   Patient attended both groups this week and seems getting used to group other mebmers.    Effectiveness of strategies: Patient was sober this week as evidenced by two UA results       Dimension #5 - Relapse Potential - Risk 2. Last use 2/3/18. Both UAs were clean this week.  Pt seems very genuine about quitting.  Patient said seeing his young cousins/nieces/nephew grow up is inspirational. Pt said he has a large family with small children and they all love him.  Pt said he connects well with all the young ones and they don't want him using marijuana.  Pt said he wants to quit because he wants to be a good role model.    Specific goals from treatment plan addressed this week: Be a good role model to young cousins/nices/nephews.  Work on improving reading skills.  Patient will set up a meeting with CD counselor to find out how much he can read. Patient seems very intelligent in group and able to provide intelligent feedbacks.    Effectiveness of " strategies: Set up a individual session with CD counselor.      Dimension #6 - Recovery Environment - Risk 2.  Patient is on social security disability and receives $703/month. 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  The record shows patient goes to 12 Step meeting in [place]. Patient is single, no children and states he would like to stay that way to focus on his own life for now. Pt states he has a supportive family.    Specific goals from treatment plan addressed this week:  Maintain structure by attending Start Service Coordination group   Effectiveness of strategies: Patient states coming to group is helping him to stay sober.       T) Treatment plan updated: N/A. Patient notified and in agreement: Yes   Patient educated on  Cross Addiction.  Patient has completed 16 of 220 program hours at this time. Projected discharge date is 5/8/2018. Current discharge plan is TBD.      REG Nathan  2/23/2018  8am     Psycho-Educational Curriculum  Date Attended  Psycho-Educational Curriculum  Date Attended    Acceptance   Shame/Guilt     1st Step   Anger/Rage     Affirmations   Mental Health     Automatic Negative Thoughts   Anxiety     Cross Addiction  2/20, 2/22  Co-Occurring Disorders     Stages of Change   Carolyn/Bipolar     Relapse   Trauma   2/14, 2/15    Addictive Thoughts   Victim Identity     Coping Skills   Sober Structure     Relapse Prevention   Continuum of Care     Medical Aspects   Non-12 Step Support     Brain/Neurotransmitters   Priorities     Medication Compliance   Spirituality     ADELE Alcohol/Drug Research   Weekend Planner     Physical Health   Educational Videos     Post Acute Withdrawal   1st Step     Pregnancy and Drug Use   2nd Step     Sexual Health   Assertive Communication     Short-Term/Long-Term Effects   My name is Jun ARGUETAJodi Nicholson   Cross Addiction     Assertive Communication   God As We Understood Him     Boundaries   HBO  "Relapse     Codependence    HBO What Is Addiction     Defense Mechanisms    Medical Aspects 1     Family Roles   Medical Aspects 2     Goodbye Letter   National Geographic: Stress     Intimacy   PBS Depression Out of the Shadows     Needs/Dealbreakers in Relationships   The Anonymous People    Socialization Skills  2/8/18  [place]     Feelings   Julian Jean \"Highjacked Brain\"    ABC Model of Emotion   Rambo Stone Humor in Tx    Grief and Loss   The Mindfulness Movie    Healthy vs. Unhealthy Feelings   Jun GARCIA documentary     Meditation/Mindfulness       Overconfidence/Complacency       Resentments       Stress           "

## 2021-06-17 NOTE — PROGRESS NOTES
D) Patient attended 3 hours of CD Outpatient/Service Coordination group   A) Pt admitted he is struggling with craving form marijuana since the last relaspe on 4/20.  Pt states he is scheduled to see his probation and be hones about everything.    T) Pt cancelled the last two 1:1s. Another 1:1 scheduled for next Thursday.

## 2021-06-17 NOTE — PROGRESS NOTES
D) 1:1. Met with patient for 40 min   A) Pt admitted he has been smoking marijuana since 4/20 on and off.  Pt said the last use was Monday 5/7.  We talked about patient s lack of attendance.  It seems like they coincide with patient s relapse.  Patient states it was helping him a lot when he was attending groups regularly.  Pt agreed that he would attend all groups including recreational groups on time from now on to see if he can get sober.  Pt agrees that if he cannot stay sober, he would go into higher level of outpatient or residential CD treatment.  Pt reports he has not called or visited the library to start .  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  Pt agrees that he will visit the library to sign up for the .  Patient states his mental health, physical health are well but worried about probation.  Pt states he will be honest to his  about the recent use and wants to get back to sobriety.  Patient reports no other stresses in life.

## 2021-06-17 NOTE — PROGRESS NOTES
D)Patient called and left a message   A) Pt said he won't be in today's group because he is helping his sister move stuff with BASSAM Sotomayor. Pt said he completed a reading assessment and would like to schedule 1:1 for next week.

## 2021-06-17 NOTE — PROGRESS NOTES
Weekly Progress Note  Patient Name: Tyler Ramsey   : 1985   MRN: [id number]    D) Patient attended 2/2 groups this week with no absence. Patient had no individual sessions this week. A) Staff facilitated groups and reviewed tx progress. Assessed for [facility]. R) No VAP needed at this time.   Any significant events, defines as events that impact patient s relationship with others inside and outside of treatment: no   Indicate any changes or monitoring of physical or mental health problems: no     Indicate involvement by any outside supports: 12 Step meeting at [facility] reviewed and modified as needed. N/A   Pt working on the following dimensions:  Dimension #1 - Withdrawal Potential - Risk 0.  Last use of marijuana reported s 2/3/2018.  UAs on 4/3 and  were clean this week.   Pt reports no concern for withdrawal this week.    Specific goals from treatment plan addressed this week: maintain sobriety.   Effectiveness of strategies: Effective. Patient is maintaining sobriety as evidenced by UA results.      Dimension #2 - Biomedical - Risk 0. No medical issues reported by patient, however patient admits he is still smokes 3-4 cigars ad week.  Pt reports he is working toward reducing cigar use.    Specific goals from treatment plan addressed this week: Pt reports he goes to [employer] to exercise.  Primary care established at [facility] with Dr. Roberto Gunter.    Effectiveness of strategies: Effective.  Patient has health insurance to access medical care if necessary.  Patient is not reporting any major medical issues at this time.      Dimension #3 - Emotional/Behavioral/Cognitive - Risk 2.  The patient has no formal diagnoses of mental illnesses.  Patient reports he went to the [facility] but did not complete the reading assessment. Pt reports he intents to get it done by next week. Pt has been procrastinating this since intake but finally went to the facility. Pt is encouraged to  complete the assessment.  1:1 scheduled to follow up.     Specific goals from treatment plan addressed this week: Patient identifies one of his goal is to  his reading skills. Patient is recommended to get his reading assessment done at [facility].   Effectiveness of strategies:  Patient participates well in group but not following through with reading assessment recommendation     Dimension #4 - Treatment Acceptance/Resistance - Risk 1.  [facility] Probation mandated to finish treatment. PO, Shayla Addison.    Specific goals from treatment plan addressed this week: Patient seems accepting his probation order and verbalizes his desire to quit.   Patient attended both groups this week and UAs are clean.    Effectiveness of strategies: Effective.  Patient was sober this week as evidenced by two UA results       Dimension #5 - Relapse Potential - Risk 2. Last use 2/3/18.  UAs were clean this week.  Patient has good insight into his addiction.  Patient has been taking about wanting to improve reading skills for weeks but has not initiated the reading assessment yet. Pt is able to participate verbally and do well.  Patient has been seen struggling to read sentences in group check in questions.  Patient seems he is procrastinating.      Specific goals from treatment plan addressed this week: Get reading assessment done at [facility] to get into a reading class.    Effectiveness of strategies: Patient attending group regularly but not initiating his reading assessment.      Dimension #6 - Recovery Environment - Risk 2.  Patient has stable housing and good family support.  Patient is on social security disability and receives $703/month. 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  The record shows patient goes to 12 Step meeting in [place]. Patient is single, no children and states he would like to stay that way to focus on his own life for now. Pt states he has an  extensive supportive family.  Patient reports his old using friends are accepting his sobriety and not bothering him.  Patient states he is telling everyone that he is no longer using and setting boundaries.    Specific goals from treatment plan addressed this week:  Develop and maintain sober support network.    Effectiveness of strategies: Patient seems he is learning healthy way to set boundaries with others and working on self care.       T) Treatment plan updated: N/A. Patient notified and in agreement: Yes   Patient educated on Stages of Change.  Patient has completed 57 of 220 program hours at this time. Projected discharge date is 5/8/2018. Current discharge plan is TBD.      REG Nathan      Psycho-Educational Curriculum  Date Attended  Psycho-Educational Curriculum  Date Attended    Acceptance   Shame/Guilt     1st Step   Anger/Rage     Affirmations   Mental Health     Automatic Negative Thoughts   Anxiety     Cross Addiction  2/20, 2/22  Co-Occurring Disorders     Stages of Change  4/10/, 4/12  Carolyn/Bipolar     Relapse   Trauma   2/14, 2/15    Addictive Thoughts  3/13. 3/15  Victim Identity     Coping Skills   Sober Structure     Relapse Prevention   Continuum of Care     Medical Aspects   Non-12 Step Support     Brain/Neurotransmitters   Priorities     Medication Compliance   Spirituality  2/27, 3/1    ADELE Alcohol/Drug Research   Weekend Planner     Physical Health   Educational Videos     Post Acute Withdrawal   1st Step     Pregnancy and Drug Use   2nd Step     Sexual Health   Assertive Communication     Short-Term/Long-Term Effects   My name is Jun Nicholson  3/29 Cross Addiction     Assertive Communication  3/20, 3/22,  God As We Understood Him     Boundaries  3/20, 3/22  HBO Relapse     Codependence   3/20, 3/22  HBO What Is Addiction     Defense Mechanisms  3/6/18, 3/8/18 Medical Aspects 1     Family Roles   Medical Aspects 2     Goodbye Letter   National Geographic: Stress  "    Intimacy   PBS Depression Out of the Shadows     Needs/Dealbreakers in Relationships   The Anonymous People    Socialization Skills  2/8/18, 3/27/18 [place]     Feelings  4/3, 4/5  Julian Jean \"Highjacked Brain\"    ABC Model of Emotion   Rambo Stone Humor in Tx    Grief and Loss   The Mindfulness Movie    Healthy vs. Unhealthy Feelings   Jun GARCIA documentary     Meditation/Mindfulness       Overconfidence/Complacency       Resentments       Stress           "

## 2021-06-17 NOTE — PROGRESS NOTES
Weekly Progress Note  Patient Name: Tyler Ramsey   : 1985   MRN: [id number]    D) Patient attended 2/3 groups this week with no absence. Patient had no individual sessions this week. A) Staff facilitated groups and reviewed tx progress. Assessed for [facility]. R) No VAP needed at this time.   Any significant events, defines as events that impact patient s relationship with others inside and outside of treatment: no   Indicate any changes or monitoring of physical or mental health problems: no     Indicate involvement by any outside supports: 12 Step meeting at [facility] reviewed and modified as needed. N/A   Pt working on the following dimensions:  Dimension #1 - Withdrawal Potential - Risk 0.  Last use of marijuana reported s 2/3/2018.  2 UAs this week and both clean.   Pt reports no concern for withdrawal this week.    Specific goals from treatment plan addressed this week: maintain sobriety.   Effectiveness of strategies: UA was clean this week.      Dimension #2 - Biomedical - Risk 0. No medical issues reported by patient, however patient admits he is still smokes 3-4 cigars ad week.  Pt reports he is working toward reducing cigar use.    Specific goals from treatment plan addressed this week: Pt reports he goes to [employer] to exercise.  Primary care established at [facility] with Dr. Roberto Gunter.    Effectiveness of strategies: Effective.  Patient has health insurance to access medical care if necessary.  Patient is not reporting any major medical issues at this time.      Dimension #3 - Emotional/Behavioral/Cognitive - Risk 1.  The patient has no formal diagnoses of mental illnesses.  Patient missed Tuesday group and later explained that he was having a stressful situation with his female friend and decided to stay.  Pt attended Wednesday and Thursday group but did not share if he compel kat the reading assessment or not.  He has been asked to do the assessment since intake and  he agreed to do so by the end of this week.    Specific goals from treatment plan addressed this week: Patient identifies one of his goal is to  his reading skills. Patient is recommended to get his reading assessment done at [facility].   Effectiveness of strategies: Patient has been able to participate very well in group.  Pt states he plans to get his reading assessment done by this week.       Dimension #4 - Treatment Acceptance/Resistance - Risk 1.  [facility] PO, Sh[facility]la Add[facility].    Specific goals from treatment plan addressed this week: Patient seems accepting his probation order and verbalizes his desire to quit.   Patient attended both groups this week and UAs are clean.    Effectiveness of strategies: Effective.  Patient was sober this week as evidenced by two UA results       Dimension #5 - Relapse Potential - Risk 2. Last use 2/3/18.  UAs were clean this week.  Patient has good insight into his addiction.  Patient has been taking about wanting to improve reading skills for weeks but has not initiated the reading assessment yet. Pt is able to participate verbally and do well.  Patient has been seen struggling to read sentences in group check in questions.     Specific goals from treatment plan addressed this week: Get reading assessment done at [facility] to get into a reading class.  Patient seems very humble and open to learning.    Effectiveness of strategies: Pending.  Patient shares in group that his goal is to be able to read better.      Dimension #6 - Recovery Environment - Risk 2.  Patient has stable housing and good family support.  Patient is on social security disability and receives $703/month. 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  The record shows patient goes to 12 Step meeting in [place]. Patient is single, no children and states he would like to stay that way to focus on his own life for  now. Pt states he has an extensive supportive family.  Patient reports his old using friends are accepting his sobriety and not bothering him.  Patient states he is telling everyone that he is no longer using and setting boundaries.    Specific goals from treatment plan addressed this week:  Develop and maintain sober support network.    Effectiveness of strategies: Patient seems he is learning healthy way to set boundaries with others and working on self care.       T) Treatment plan updated: N/A. Patient notified and in agreement: Yes   Patient educated on Relationship.  Patient has completed 45 of 220 program hours at this time. Projected discharge date is 5/8/2018. Current discharge plan is TBD.      REG Nathan      Psycho-Educational Curriculum  Date Attended  Psycho-Educational Curriculum  Date Attended    Acceptance   Shame/Guilt     1st Step   Anger/Rage     Affirmations   Mental Health     Automatic Negative Thoughts   Anxiety     Cross Addiction  2/20, 2/22  Co-Occurring Disorders     Stages of Change   Carolyn/Bipolar     Relapse   Trauma   2/14, 2/15    Addictive Thoughts  3/13. 3/15  Victim Identity     Coping Skills   Sober Structure     Relapse Prevention   Continuum of Care     Medical Aspects   Non-12 Step Support     Brain/Neurotransmitters   Priorities     Medication Compliance   Spirituality  2/27, 3/1    ADELE Alcohol/Drug Research   Weekend Planner     Physical Health   Educational Videos     Post Acute Withdrawal   1st Step     Pregnancy and Drug Use   2nd Step     Sexual Health   Assertive Communication     Short-Term/Long-Term Effects   My name is Jun GARCIA    Relationships  3/29 Cross Addiction     Assertive Communication  3/20, 3/22,  God As We Understood Him     Boundaries  3/20, 3/22  HBO Relapse     Codependence   3/20, 3/22  HBO What Is Addiction     Defense Mechanisms  3/6/18, 3/8/18 Medical Aspects 1     Family Roles   Medical Aspects 2     Goodbye Letter   National  "Geographic: Stress     Intimacy   PBS Depression Out of the Shadows     Needs/Dealbreakers in Relationships   [employer] People    Socialization Skills  2/8/18, 3/27/18 [place]     Feelings   Julian Jean \"Highjacked Brain\"    ABC Model of Emotion   Rambo Stone Humor in Tx    Grief and Loss   The Mindfulness Movie    Healthy vs. Unhealthy Feelings   Jun GARCIA documentary     Meditation/Mindfulness       Overconfidence/Complacency       Resentments       Stress           "

## 2021-06-17 NOTE — PROGRESS NOTES
Weekly Progress Note  Patient Name: Tyler Ramsey   : 1985   MRN: [id number]    D) Patient attended 2/2 groups this week with no absence. Patient had no individual sessions this week. A) Staff facilitated groups and reviewed tx progress. Assessed for [facility]. R) No VAP needed at this time.   Any significant events, defines as events that impact patient s relationship with others inside and outside of treatment: no   Indicate any changes or monitoring of physical or mental health problems: no     Indicate involvement by any outside supports: 12 Step meeting at [facility] reviewed and modified as needed. N/A   Pt working on the following dimensions:  Dimension #1 - Withdrawal Potential - Risk 0.  Patient reports he relapsed with marijuana on 18.  UAs on  and  came positive for THC.  No withdrawal concern reported or observed.    Specific goals from treatment plan addressed this week: Stop using and start living sober life again   Effectiveness of strategies: Patient came to CD group and he was honest about the relapse.  Pt  admitted to his using on .     Dimension #2 - Biomedical - Risk 0. No medical issues reported by patient, however patient admits he is still smokes 3-4 cigars ad week.  Pt reports he is working toward reducing cigar use.    Specific goals from treatment plan addressed this week: Pt reports he goes to [employer] to exercise.  Primary care established at [facility] with Dr. Roberto Gunter.    Effectiveness of strategies: Effective.  Patient has health insurance to access medical care if necessary.  Patient is not reporting any major medical issues at this time.      Dimension #3 - Emotional/Behavioral/Cognitive - Risk 2.  The patient has no formal diagnoses of mental illnesses.  Patient reports completed reading assessment at [facility] and was recommended to start tutoring by his [employer].  Pt is scheduled for 1:1 to discuss his progress.   Patient admitted to using marijuana on 4/20 after visiting his friends who were having BBQ.    Specific goals from treatment plan addressed this week: Patient identifies one of his goal is to  his reading skills. 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    Effectiveness of strategies:  Patient states he will start .      Dimension #4 - Treatment Acceptance/Resistance - Risk 3.  [facility] PO[facility].    Specific goals from treatment plan addressed this week: Patient missed all CD treatment last week and showed up to both groups this week.  Pt admitted he relapsed and probatio was notified.    Effectiveness of strategies: Patient relapsed.      Dimension #5 - Relapse Potential - Risk 3. Last use 4/20/18.  It seems 420 was a trigger for patient. Pt said people celebrate the day and he just smoked marijuana for no reason. Patient's UA came positive on 4/24 a d 4/26.  Patient may have had a pre-relapse episode as evidenced by him not attending any groups the week prior.  Pt explained that he had to help his sister move.  1:1 scheduled for next week to discuss analyze the relapse and create prevention plan.    Specific goals from treatment plan addressed this week: Get reading assessment done at [facility] to get into a reading class.    Effectiveness of strategies: Patient reports he was recommended to start reading tutoring at [employer].      Dimension #6 - Recovery Environment - Risk 2.  Patient has stable housing and good family support.  Patient is on social security disability and receives $703/month. 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  The record shows patient goes to 12 Step meeting in [place]. Patient is single, no children and states he would like to stay that way to focus on his own life for now. Pt states he has an extensive supportive family.   Patient reports his old using friends are accepting his sobriety and not bothering him.  Patient states he is telling everyone that he is no longer using and setting boundaries.    Specific goals from treatment plan addressed this week:  Develop and maintain sober support network.    Effectiveness of strategies: Patient seems he is learning healthy way to set boundaries with others and working on self care.       T) Treatment plan updated: N/A. Patient notified and in agreement: Yes   Patient educated on Affirmation.   Patient has completed 62 of 220 program hours at this time. Projected discharge date is 5/8/2018. Current discharge plan is TBD.      REG Nathan      Psycho-Educational Curriculum  Date Attended  Psycho-Educational Curriculum  Date Attended    Acceptance   Shame/Guilt     1st Step   Anger/Rage     Affirmations  4/24/18, 4/26/18 Mental Health     Automatic Negative Thoughts   Anxiety     Cross Addiction  2/20, 2/22  Co-Occurring Disorders     Stages of Change  4/10/, 4/12  Carolyn/Bipolar     Relapse   Trauma   2/14, 2/15    Addictive Thoughts  3/13. 3/15  Victim Identity     Coping Skills   Sober Structure     Relapse Prevention   Continuum of Care     Medical Aspects   Non-12 Step Support     Brain/Neurotransmitters   Priorities     Medication Compliance   Spirituality  2/27, 3/1    ADELE Alcohol/Drug Research   Weekend Planner     Physical Health   Educational Videos     Post Acute Withdrawal   1st Step     Pregnancy and Drug Use   2nd Step     Sexual Health   Assertive Communication     Short-Term/Long-Term Effects   My name is Jun Nicholson  3/29 Cross Addiction     Assertive Communication  3/20, 3/22,  God As We Understood Him     Boundaries  3/20, 3/22  HBO Relapse     Codependence   3/20, 3/22  HBO What Is Addiction     Defense Mechanisms  3/6/18, 3/8/18 Medical Aspects 1     Family Roles   Medical Aspects 2     Goodbye Letter   National Geographic: Stress     Intimacy    "PBS Depression Out of the Shadows     Needs/Dealbreakers in Relationships   The Anonymous People    Socialization Skills  2/8/18, 3/27/18 [place]     Feelings  4/3, 4/5  Julian Jean \"Highjacked Brain\"    ABC Model of Emotion   Rambo Stone Humor in [place]x    Grief and Loss   The Mindfulness Movie    Healthy vs. Unhealthy Feelings   Jun GARCIA documentary     Meditation/Mindfulness       Overconfidence/Complacency       Resentments       Stress           "

## 2021-06-17 NOTE — PROGRESS NOTES
D) Patient came in late and attended 2 hours of CD Outpatient/Service Coordination group today.   A) Patient reports he relapsed with marijuana on 4/20.   Pt said that was not the reason why he missed group last week. Pt said he was invited to his friend's BBQ on 4/20 and people were smoking marijuana and he relapsed.  Pt states he finished the reading assessment at [facility] and was recommended to connect with a local library by his house to start reading tutoring.

## 2021-06-17 NOTE — PROGRESS NOTES
Weekly Progress Note  Patient Name: Tyler Ramsey   : 1985   MRN: [id number]    D) Patient attended 1/2 groups this week with one absence. Patient had one individual sessions this week. A) Staff facilitated groups and reviewed tx progress. Ass[facility] for [facility]. R) No VAP needed at this time.   Any significant events, defines as events that impact patient s relationship with others inside and outside of treatment: no   Indicate any changes or monitoring of physical or mental health problems: no     Indicate involvement by any outside supports: 12 Step meeting at [facility] reviewed and modified as needed. N/A   Pt working on the following dimensions:  Dimension #1 - Withdrawal Potential - Risk 0.  Patient finally admitted that he has bee actively smoking marijuana on and off since .  Patient's UA since then have been all positive.  No withdrawal concern reported.    Specific goals from treatment plan addressed this week:  Turn in clean UA   Effectiveness of strategies: Not effective.  Patient states he wants to get sober and will attend all groups from now on.      Dimension #2 - Biomedical - Risk 0. No medical issues reported by patient, however patient admits he is still smokes 3-4 cigars ad week.  Pt reports he is working toward reducing cigar use.    Specific goals from treatment plan addressed this week: Pt reports he goes to [employer] to exercise.  Primary care established at [facility] with Dr. Roberto Gunter.    Effectiveness of strategies: Effective.  Patient has health insurance to access medical care if necessary.  Patient is not reporting any major medical issues at this time.      Dimension #3 - Emotional/Behavioral/Cognitive - Risk 2.  The patient has no formal diagnoses of mental illnesses.  Patient was no show to Tuesday group. Patient had 1:1 On Thursday and admitted he has been smoking marijuana on and off since .  Pt states he is looking at 34 months on prison and  he needs to get sober.  Pt states he is scheduled to meet with his  and he will be honest about his relapse. Pt agreed that making to all group will help him to get back on structurea nd stay sober.  Patient admitted he has not visited library to follow through with . Pt agreed he would visit the library to get this moving.    Specific goals from treatment plan addressed this week: Attend all CD groups and start reading tutoring.    Effectiveness of strategies:  Pending.  Patient has been using marijuana and did not follow through with reading assistance.  Pt reevaluated his life goals and decided to start attending groups more.      Dimension #4 - Treatment Acceptance/Resistance - Risk 3.  [facility] PO, Shayla Addison.    Specific goals from treatment plan addressed this week: Patient missed one 1:1 and one CD group this week  Effectiveness of strategies: Patient admits actively smoking marijuana since 4/20 and struggling to quit.      Dimension #5 - Relapse Potential - Risk 3. Last use 5/8/2018. Patient has been missing groups without reasons in recent weeks.  Patient finally admitted that he has lydia struggling to quit marijuana since 4/20 when he started using.  Pt states he is hanging out with wrong type of people who encourages use.  Pt states he has 35 months hanging over his head and feeling the need to quit smoking.  Patient agreed he would attend all CD groups and start going to reading assistance to keep busy.  Pt agrees that if he cannot stay sober, he would go to a residential program.    Specific goals from treatment plan addressed this week: Relapse prevention skills    Effectiveness of strategies: Patient is actively smoking marijuana.       Dimension #6 - Recovery Environment - Risk 2.  Patient has stable housing and good family support.  Patient is on social security disability and receives $703/month. 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  The record shows patient goes to 12 Step meeting in [place]. Patient is single, no children and states he would like to stay that way to focus on his own life for now. Pt states he has an extensive supportive family.  Patient reports his old using friends are accepting his sobriety and not bothering him.  Patient states he is telling everyone that he is no longer using and setting boundaries.    Specific goals from treatment plan addressed this week:  Develop and maintain sober support network.    Effectiveness of strategies: Patient seems he is learning healthy way to set boundaries with others and working on self care.       T) Treatment plan updated: N/A. Patient notified and in agreement: Yes   Patient educated onshort term/long term effects .   Patient has completed 69 of 220 program hours at this time. Projected discharge date is 5/8/2018. Current discharge plan is TBD.      REG Nathan      Psycho-Educational Curriculum  Date Attended  Psycho-Educational Curriculum  Date Attended    Acceptance   Shame/Guilt     1st Step   Anger/Rage     Affirmations  4/24/18, 4/26/18 Mental Health     Automatic Negative Thoughts   Anxiety     Cross Addiction  2/20, 2/22  Co-Occurring Disorders     Stages of Change  4/10/, 4/12  Carolyn/Bipolar     Relapse   Trauma   2/14, 2/15    Addictive Thoughts  3/13. 3/15  Victim Identity     Coping Skills   Sober Structure     Relapse Prevention   Continuum of Care     Medical Aspects   Non-12 Step Support     Brain/Neurotransmitters   Priorities     Medication Compliance   Spirituality  2/27, 3/1    ADELE Alcohol/Drug Research   Weekend Planner     Physical Health   Educational Videos     Post Acute Withdrawal   1st Step     Pregnancy and Drug Use   2nd Step     Sexual Health   Assertive Communication     Short-Term/Long-Term Effects   My name is Jun GARCIA    Bharat  3/29 Cross Addiction     Assertive  "Communication  3/20, 3/22,  God As We Understood Him     Boundaries  3/20, 3/22  HBO Relapse     Codependence   3/20, 3/22  HBO What Is Addiction     Defense Mechanisms  3/6/18, 3/8/18 Medical Aspects 1     Family Roles   Medical Aspects 2     Goodbye Letter   National Geographic: Stress     Intimacy  5/10 PBS Depression Out of the Shadows     Short term and long term effect  The Anonymous People    Socialization Skills  2/8/18, 3/27/18 [place]     Feelings  4/3, 4/5  Julian Jean \"Highjacked Brain\"    ABC Model of Emotion   Rambo Stone Humor in Tx    Grief and Loss   The Mindfulness Movie    Healthy vs. Unhealthy Feelings   Jun GARCIA documentary     Meditation/Mindfulness   Nicotine Cessation  5/3   Overconfidence/Complacency       Resentments       Stress           "

## 2021-06-17 NOTE — PROGRESS NOTES
Patient attended 2 hours of CD Outpatient/Service Coordination, monthly recreational/sober group.

## 2021-06-17 NOTE — PROGRESS NOTES
D) Patient attended 3 hours of CD Outpatient/ Service Coordination group   A) Patient said he is going to [facility] tomorrow to get reading assessment done     
Kevin/Resident

## 2021-06-18 NOTE — PROGRESS NOTES
Weekly Progress Note  Patient Name: Tyler Ramsey   : 1985   MRN: [id number]    D) Patient attended 2/2 groups this week with one absence. Patient had no  individual sessions this week. A) Staff facilitated groups and reviewed tx progress. Assessed for [facility]. R) No VAP needed at this time.   Any significant events, defines as events that impact patient s relationship with others inside and outside of treatment: no   Indicate any changes or monitoring of physical or mental health problems: no     Indicate involvement by any outside supports: 12 Step meeting at [facility] reviewed and modified as needed. N/A   Pt working on the following dimensions:  Dimension #1 - Withdrawal Potential - Risk 0.  Patient's UAs have been clean since .   Two UAs this week and both clean.   No withdrawal concern reported by patient or observed.    Specific goals from treatment plan addressed this week:  Turn in clean UA   Effectiveness of strategies: Effective.       Dimension #2 - Biomedical - Risk 0. No medical issues reported by patient, however patient admits he is still smokes 3-4 cigars ad week.  Pt reports he is working toward reducing cigar use.    Specific goals from treatment plan addressed this week: Pt reports he goes to [employer] to exercise.  Primary care established at [facility] with Dr. Roberto Gunter.    Effectiveness of strategies: Effective.  Patient has health insurance to access medical care if necessary.  Patient is not reporting any major medical issues at this time.      Dimension #3 - Emotional/Behavioral/Cognitive - Risk 1.  The patient has no formal diagnoses of mental illnesses.  Patient seems he is exploring his understanding  by listening to others, but his illiteracy seems to be causing some delay in full understanding of the group lecture. Pt previously agreed to start getting help form a group member, but this plan in on hold at this time due to other members personal  situation.    Specific goals from treatment plan addressed this week: Attend all CD groups    Effectiveness of strategies:  Patient participates in group with     Dimension #4 - Treatment Acceptance/Resistance - Risk 0.  [facility] PO, Shayla Addison.  UA clean since 5/29.    Specific goals from treatment plan addressed this week: Patient to attend all groups and learn to cope with cravings    Effectiveness of strategies:  Patient attended both groups this week and celebrated 28 days sobriety.      Dimension #5 - Relapse Potential - Risk 2. Last use 5/14.  UA clean since 5/29.   It seems patient is struggling with lecture and participation in group due to his illiteracy.  It seems this illiteracy is causing some self consciousness and hesitation in group discussion.   Pt will benefit from assistance in learning to read.    Specific goals from treatment plan addressed this week: Relapse prevention skills and improving self confidence.      Effectiveness of strategies:   Patient seems willing to attend groups but reading help have been difficult to find.     Dimension #6 - Recovery Environment - Risk 2.  Patient has stable housing and good family support.  Patient is on social security disability and receives $703/month. 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  The record shows patient goes to 12 Step meeting in [place]. Patient is single, no children and states he would like to stay that way to focus on his own life for now. Pt states he has an extensive supportive family.  Patient reports his old using friends are accepting his sobriety and not bothering him.  Patient states he is telling everyone that he is no longer using and setting boundaries.    Specific goals from treatment plan addressed this week:  Develop and maintain sober support network.    Effectiveness of strategies: Patient seems he is learning healthy way to set  "boundaries with others and working on self care.       T) Treatment plan updated: N/A. Patient notified and in agreement: Yes   Patient was educated on Masks and Feelings.   Patient has completed 95 of 226 program hours at this time. Projected discharge date is 5/8/2018. Current discharge plan is TBD.      REG Nathan      Psycho-Educational Curriculum  Date Attended  Psycho-Educational Curriculum  Date Attended    Acceptance   Shame/Guilt     1st Step   Anger/Rage     Affirmations  4/24/18, 4/26/18 Mental Health     Automatic Negative Thoughts   Anxiety     Cross Addiction  2/20, 2/22  Co-Occurring Disorders     Stages of Change  4/10/, 4/12  Carolyn/Bipolar     Relapse   Trauma   2/14, 2/15    Addictive Thoughts  3/13. 3/15  Victim Identity     Coping Skills   Sober Structure  6/12   Relapse Prevention   Continuum of Care     Medical Aspects   Non-12 Step Support     Brain/Neurotransmitters   Priorities     Medication Compliance  5/15  Spirituality  2/27, 3/1    ADELE Alcohol/Drug Research   Weekend Planner     Physical Health   Educational Videos     Post Acute Withdrawal   1st Step     Pregnancy and Drug Use   2nd Step     Sexual Health  5/15 Assertive Communication     Short-Term/Long-Term Effects   My name is Jun Nicholson  3/29 Cross Addiction     Assertive Communication  3/20, 3/22,  God As We Understood Him     Boundaries  3/20, 3/22  HBO Relapse     Codependence   3/20, 3/22  HBO What Is Addiction     Defense Mechanisms  3/6/18, 3/8/18 Medical Aspects 1     Family Roles   Medical Aspects 2     Goodbye Letter  6/5, 5/7  National Geographic: Stress     Intimacy  5/10 PBS Depression Out of the Shadows     Short term and long term effect  The Anonymous People    Socialization Skills  2/8/18, 3/27/18 [place]     Feelings  4/3, 4/5  Julian Jean \"Highjacked Brain\"    ABC Model of Emotion   Rambo Stone Humor in [place]    Grief and Loss  5/29  The Mindfulness Movie    Healthy vs. Unhealthy " Feelings   Jun GARCIA documentary     Meditation/Mindfulness   Nicotine Cessation  5/3/18   Overconfidence/Complacency  5/22/18, 5/24/18  Masks  6/19/18, 6/21/18    Resentments       Stress

## 2021-06-18 NOTE — PROGRESS NOTES
Weekly Progress Note  Patient Name: Tyler Ramsey   : 1985   MRN: [id number]    D) Patient attended 1/2 groups this week with one absence. Patient had one individual sessions this week. A) Staff facilitated groups and reviewed tx progress. [facility] for [facility]. R) No VAP needed at this time.   Any significant events, defines as events that impact patient s relationship with others inside and outside of treatment: no   Indicate any changes or monitoring of physical or mental health problems: no     Indicate involvement by any outside supports: 12 Step meeting at [facility] reviewed and modified as needed. N/A   Pt working on the following dimensions:  Dimension #1 - Withdrawal Potential - Risk 0.  Patient's UA finally came clean on  after month's straight positive THC.  No withdrawal concern reported by patient or observed.    Specific goals from treatment plan addressed this week:  Turn in clean UA   Effectiveness of strategies: Effective.  UA finally came clean.      Dimension #2 - Biomedical - Risk 0. No medical issues reported by patient, however patient admits he is still smokes 3-4 cigars ad week.  Pt reports he is working toward reducing cigar use.    Specific goals from treatment plan addressed this week: Pt reports he goes to [employer] to exercise.  Primary care established at [facility] with Dr. Roberto Gunter.    Effectiveness of strategies: Effective.  Patient has health insurance to access medical care if necessary.  Patient is not reporting any major medical issues at this time.      Dimension #3 - Emotional/Behavioral/Cognitive - Risk 2.  The patient has no formal diagnoses of mental illnesses.  Patient admitted on Tuesday that he was sober for 10 days.  His UA have been coming positive since  relapse and on and off using since then.   Now that his UA came clean again, patient will be able to work on his goal of learning to read.  Patient came for 1:1 this week and  admitted he has been feeling intimidated to go seek help at the library.  Pt was open to getting help from an old retired group member who has college journalism degree and wanting to help others.   Patient was no show to Thursday group. Patient may have left a message on Thursday.  There was a phone message from someone that sounds like patient's voice but the voice was very quiet and could not understand the message.    Specific goals from treatment plan addressed this week: Attend all CD groups and start reading tutoring.    Effectiveness of strategies:  Patient missed Thursday group. Patient seems he is open to getting the reading help from a group member.      Dimension #4 - Treatment Acceptance/Resistance - Risk 3.  [facility] KAYLYNN, Shayla Addison.  Probation notified of UA finally clean after 1 months of positive THC.    Specific goals from treatment plan addressed this week: Patient to attend all groups and learn to cope with cravings    Effectiveness of strategies:  Patient was absent for Thursday group.       Dimension #5 - Relapse Potential - Risk 3. Last use 5/14.  Pt is early in his sobriety. Patient identifies his trigger as using friends and trying to not associate with them.    Specific goals from treatment plan addressed this week: Relapse prevention skills    Effectiveness of strategies: Pending. Patient will need more time in this area.      Dimension #6 - Recovery Environment - Risk 2.  Patient has stable housing and good family support.  Patient is on social security disability and receives $703/month. 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  The record shows patient goes to 12 Step meeting in [place]. Patient is single, no children and states he would like to stay that way to focus on his own life for now. Pt states he has an extensive supportive family.  Patient reports his old using friends are accepting  his sobriety and not bothering him.  Patient states he is telling everyone that he is no longer using and setting boundaries.    Specific goals from treatment plan addressed this week:  Develop and maintain sober support network.    Effectiveness of strategies: Patient seems he is learning healthy way to set boundaries with others and working on self care.       T) Treatment plan updated: N/A. Patient notified and in agreement: Yes   Patient was educated on Grief and Loss.   Patient has completed 87 of 220 program hours at this time. Projected discharge date is 5/8/2018. Current discharge plan is TBD.      REG Nathan      Psycho-Educational Curriculum  Date Attended  Psycho-Educational Curriculum  Date Attended    Acceptance   Shame/Guilt     1st Step   Anger/Rage     Affirmations  4/24/18, 4/26/18 Mental Health     Automatic Negative Thoughts   Anxiety     Cross Addiction  2/20, 2/22  Co-Occurring Disorders     Stages of Change  4/10/, 4/12  Carolyn/Bipolar     Relapse   Trauma   2/14, 2/15    Addictive Thoughts  3/13. 3/15  Victim Identity     Coping Skills   Sober Structure     Relapse Prevention   Continuum of Care     Medical Aspects   Non-12 Step Support     Brain/Neurotransmitters   Priorities     Medication Compliance  5/15  Spirituality  2/27, 3/1    ADELE Alcohol/Drug Research   Weekend Planner     Physical Health   Educational Videos     Post Acute Withdrawal   1st Step     Pregnancy and Drug Use   2nd Step     Sexual Health  5/15 Assertive Communication     Short-Term/Long-Term Effects   My name is Jnu Nicholson  3/29 Cross Addiction     Assertive Communication  3/20, 3/22,  God As We Understood Him     Boundaries  3/20, 3/22  HBO Relapse     Codependence   3/20, 3/22  HBO What Is Addiction     Defense Mechanisms  3/6/18, 3/8/18 Medical Aspects 1     Family Roles   Medical Aspects 2     Goodbye Letter   National Geographic: Stress     Intimacy  5/10 PBS Depression Out of the Shadows   "   Short term and long term effect  The Anonymous People    Socialization Skills  2/8/18, 3/27/18 [place]     Feelings  4/3, 4/5  Julian Jean \"Highjacked Brain\"    ABC Model of Emotion   Rambo Stone Humor in [place]    Grief and Loss  5/29  The Mindfulness Movie    Healthy vs. Unhealthy Feelings   Jun GARCIA documentary     Meditation/Mindfulness   Nicotine Cessation  5/3   Overconfidence/Complacency  5/22/18, 5/24/18      Resentments       Stress           "

## 2021-06-18 NOTE — PROGRESS NOTES
Weekly Progress Note  Patient Name: Tyler Ramsey   : 1985   MRN: [id number]    D) Patient attended 3/3 groups this week with no absence. Patient had no individual sessions this week. A) Staff facilitated groups and reviewed tx progress. [facility] for [facility]. R) No VAP needed at this time.   Any significant events, defines as events that impact patient s relationship with others inside and outside of treatment: no   Indicate any changes or monitoring of physical or mental health problems: no     Indicate involvement by any outside supports: 12 Step meeting at [facility] reviewed and modified as needed. N/A   Pt working on the following dimensions:  Dimension #1 - Withdrawal Potential - Risk 0.  Patient reports on  that he was sober from marijuana for 10 days.  Last use was no .  UAs since  are all positive for THC.    Specific goals from treatment plan addressed this week:  Turn in clean UA   Effectiveness of strategies: Not effective.      Dimension #2 - Biomedical - Risk 0. No medical issues reported by patient, however patient admits he is still smokes 3-4 cigars ad week.  Pt reports he is working toward reducing cigar use.    Specific goals from treatment plan addressed this week: Pt reports he goes to [employer] to exercise.  Primary care established at [facility] with Dr. Roberto Gunter.    Effectiveness of strategies: Effective.  Patient has health insurance to access medical care if necessary.  Patient is not reporting any major medical issues at this time.      Dimension #3 - Emotional/Behavioral/Cognitive - Risk 2.  The patient has no formal diagnoses of mental illnesses.  Patient attended all three CD groups this week.  Patient admits he is procrastinating the  and ha snot taken the tutoring yet.  Patient states he has goals in mind for the long term but short term goals like reading is very difficult to follow through.  Patient is unable to read  "simple reading such as group check in questions. Group members are helping patient read.  Patient is able to participate.  Patient reports on Thursday 5/24 that he was sober for 10 days.   Specific goals from treatment plan addressed this week: Attend all CD groups and start reading tutoring.    Effectiveness of strategies:  Patient's group attendance is good, but he has to make to reading help/      Dimension #4 - Treatment Acceptance/Resistance - Risk 3.  [facility] PO, Shayla Addison.    Specific goals from treatment plan addressed this week: Patient to attend all groups and learn to cope with cravings    Effectiveness of strategies:  Patient admits \"I am being lazy\" and have not accessed .  Patient has been reminded of this multiple times     Dimension #5 - Relapse Potential - Risk 3. Last use 5/14 and still struggling with craving.  Since 4/20, patient has been using on and off.    Specific goals from treatment plan addressed this week: Relapse prevention skills    Effectiveness of strategies: Patient is attending groups and states it is helping him.      Dimension #6 - Recovery Environment - Risk 2.  Patient has stable housing and good family support.  Patient is on social security disability and receives $703/month. 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  The record shows patient goes to 12 Step meeting in [place]. Patient is single, no children and states he would like to stay that way to focus on his own life for now. Pt states he has an extensive supportive family.  Patient reports his old using friends are accepting his sobriety and not bothering him.  Patient states he is telling everyone that he is no longer using and setting boundaries.    Specific goals from treatment plan addressed this week:  Develop and maintain sober support network.    Effectiveness of strategies: Patient seems he is learning " "healthy way to set boundaries with others and working on self care.       T) Treatment plan updated: N/A. Patient notified and in agreement: Yes   Patient was educated on Overconfidence/Complacency.   Patient has completed 83 of 220 program hours at this time. Projected discharge date is 5/8/2018. Current discharge plan is TBD.      Martha Herdeia [facility]      Psycho-Educational Curriculum  Date Attended  Psycho-Educational Curriculum  Date Attended    Acceptance   Shame/Guilt     1st Step   Anger/Rage     Affirmations  4/24/18, 4/26/18 Mental Health     Automatic Negative Thoughts   Anxiety     Cross Addiction  2/20, 2/22  Co-Occurring Disorders     Stages of Change  4/10/, 4/12  Carolyn/Bipolar     Relapse   Trauma   2/14, 2/15    Addictive Thoughts  3/13. 3/15  Victim Identity     Coping Skills   Sober Structure     Relapse Prevention   Continuum of Care     Medical Aspects   Non-12 Step Support     Brain/Neurotransmitters   Priorities     Medication Compliance  5/15  Spirituality  2/27, 3/1    ADELE Alcohol/Drug Research   Weekend Planner     Physical Health   Educational Videos     Post Acute Withdrawal   1st Step     Pregnancy and Drug Use   2nd Step     Sexual Health  5/15 Assertive Communication     Short-Term/Long-Term Effects   My name is Jun Nicholson  3/29 Cross Addiction     Assertive Communication  3/20, 3/22,  God As We Understood Him     Boundaries  3/20, 3/22  HBO Relapse     Codependence   3/20, 3/22  HBO What Is Addiction     Defense Mechanisms  3/6/18, 3/8/18 Medical Aspects 1     Family Roles   Medical Aspects 2     Goodbye Letter   [employer] Geographic: Stress     Intimacy  5/10 PBS Depression Out of the Shadows     Short term and long term effect  The Anonymous People    Socialization Skills  2/8/18, 3/27/18 [place]     Feelings  4/3, 4/5  Julian Jean \"Highjacked Brain\"    ABC Model of Emotion   Rambo Stone Humor in [place]    Grief and Loss   The Mindfulness Movie    Healthy vs. " Unhealthy Feelings   Jun GARCIA documentary     Meditation/Mindfulness   Nicotine Cessation  5/3   Overconfidence/Complacency  5/22/18, 5/24/18      Resentments       Stress

## 2021-06-18 NOTE — PROGRESS NOTES
Weekly Progress Note  Patient Name: Tyler Ramsey   : 1985   MRN: [id number]    D) Patient attended 1/2 groups this week with one absence. Patient had no  individual sessions this week. A) Staff facilitated groups and reviewed tx progress. [facility] for [facility]. R) No VAP needed at this time.   Any significant events, defines as events that impact patient s relationship with others inside and outside of treatment: no   Indicate any changes or monitoring of physical or mental health problems: no     Indicate involvement by any outside supports: 12 Step meeting at [facility] reviewed and modified as needed. N/A   Pt working on the following dimensions:  Dimension #1 - Withdrawal Potential - Risk 0.  Patient's UAs have been clean since .   No withdrawal concern reported by patient or observed.    Specific goals from treatment plan addressed this week:  Turn in clean UA   Effectiveness of strategies: Effective.       Dimension #2 - Biomedical - Risk 0. No medical issues reported by patient, however patient admits he is still smokes 3-4 cigars ad week.  Pt reports he is working toward reducing cigar use.    Specific goals from treatment plan addressed this week: Pt reports he goes to [employer] to exercise.  Primary care established at [facility] with Dr. Roberto Gunter.    Effectiveness of strategies: Effective.  Patient has health insurance to access medical care if necessary.  Patient is not reporting any major medical issues at this time.      Dimension #3 - Emotional/Behavioral/Cognitive - Risk 2.  The patient has no formal diagnoses of mental illnesses.  Patient was no show to Thursday group.  Prior to the Thursday group, patient was expected tos tart a reading hep from a group member, but he was no show to the meeting and the group.   Specific goals from treatment plan addressed this week: Attend all CD groups and start reading tutoring.    Effectiveness of strategies:  Patient was  absent from the first reading help.  Pt was absent for the group on that day as well.      Dimension #4 - Treatment Acceptance/Resistance - Risk 2.  [facility] PO, Shayla Addison.    Specific goals from treatment plan addressed this week: Patient to attend all groups and learn to cope with cravings    Effectiveness of strategies:  Patient attended only one group.  He was late for the Tuesday group and only attended 2 hours.      Dimension #5 - Relapse Potential - Risk 2. Last use 5/14.  UA clean since 5/29.   Patient seems to struggle with procrastination.  Pt was well informed to start a reading help on Thursday right before the group, but he was no show to both the meeting and the group as well.  Patient has identified his problem as not being able to read well.  Pt is being encouraged to work toward his goal.     Specific goals from treatment plan addressed this week: Relapse prevention skills and improving self confidence.      Effectiveness of strategies:   Pending     Dimension #6 - Recovery Environment - Risk 2.  Patient has stable housing and good family support.  Patient is on social security disability and receives $703/month. 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  The record shows patient goes to 12 Step meeting in [place]. Patient is single, no children and states he would like to stay that way to focus on his own life for now. Pt states he has an extensive supportive family.  Patient reports his old using friends are accepting his sobriety and not bothering him.  Patient states he is telling everyone that he is no longer using and setting boundaries.    Specific goals from treatment plan addressed this week:  Develop and maintain sober support network.    Effectiveness of strategies: Patient seems he is learning healthy way to set boundaries with others and working on self care.       T) Treatment plan updated:  "N/A. Patient notified and in agreement: Yes   Patient was educated on Sober Structure/Sober Support.   Patient has completed 89 of 226 program hours at this time. Projected discharge date is 5/8/2018. Current discharge plan is TBD.      REG Nathan      Psycho-Educational Curriculum  Date Attended  Psycho-Educational Curriculum  Date Attended    Acceptance   Shame/Guilt     1st Step   Anger/Rage     Affirmations  4/24/18, 4/26/18 Mental Health     Automatic Negative Thoughts   Anxiety     Cross Addiction  2/20, 2/22  Co-Occurring Disorders     Stages of Change  4/10/, 4/12  Carolyn/Bipolar     Relapse   Trauma   2/14, 2/15    Addictive Thoughts  3/13. 3/15  Victim Identity     Coping Skills   Sober Structure  6/12   Relapse Prevention   Continuum of Care     Medical Aspects   Non-12 Step Support     Brain/Neurotransmitters   Priorities     Medication Compliance  5/15  Spirituality  2/27, 3/1    ADELE Alcohol/Drug Research   Weekend Planner     Physical Health   Educational Videos     Post Acute Withdrawal   1st Step     Pregnancy and Drug Use   2nd Step     Sexual Health  5/15 Assertive Communication     Short-Term/Long-Term Effects   My name is Jun GARCIA    Relationships  3/29 Cross Addiction     Assertive Communication  3/20, 3/22,  God As We Understood Him     Boundaries  3/20, 3/22  HBO Relapse     Codependence   3/20, 3/22  HBO What Is Addiction     Defense Mechanisms  3/6/18, 3/8/18 Medical Aspects 1     Family Roles   Medical Aspects 2     Goodbye Letter  6/5, 5/7  [employer] Geographic: Stress     Intimacy  5/10 PBS Depression Out of the Shadows     Short term and long term effect  The Anonymous People    Socialization Skills  2/8/18, 3/27/18 [place]     Feelings  4/3, 4/5  Julian Jean \"Highjacked Brain\"    ABC Model of Emotion   Rambo Stone Humor in [place]    Grief and Loss  5/29  The Mindfulness Movie    Healthy vs. Unhealthy Feelings   Jun GARCIA documentary     Meditation/Mindfulness   Nicotine " Cessation  5/3   Overconfidence/Complacency  5/22/18, 5/24/18      Resentments       Stress

## 2021-06-18 NOTE — PROGRESS NOTES
Weekly Progress Note  Patient Name: Tyler Ramsey   : 1985   MRN: [id number]    D) Patient attended 2/2 groups this week with no absence. Patient had one individual sessions this week. A) Staff facilitated groups and reviewed tx progress. [facility] for [facility]. R) No VAP needed at this time.   Any significant events, defines as events that impact patient s relationship with others inside and outside of treatment: no   Indicate any changes or monitoring of physical or mental health problems: no     Indicate involvement by any outside supports: 12 Step meeting at [facility] reviewed and modified as needed. N/A   Pt working on the following dimensions:  Dimension #1 - Withdrawal Potential - Risk 0.  Patient finally admitted that he has bee actively smoking marijuana on and off since .  Patient's UA since then have been all positive.  No withdrawal concern reported.  Patient reports this week that his last use was 5/7 and trying to stay sober.    Specific goals from treatment plan addressed this week:  Turn in clean UA   Effectiveness of strategies: Not effective.  Patient states he wants to get sober and will attend all groups from now on.      Dimension #2 - Biomedical - Risk 0. No medical issues reported by patient, however patient admits he is still smokes 3-4 cigars ad week.  Pt reports he is working toward reducing cigar use.    Specific goals from treatment plan addressed this week: Pt reports he goes to [employer] to exercise.  Primary care established at [facility] with Dr. Roberto Gunter.    Effectiveness of strategies: Effective.  Patient has health insurance to access medical care if necessary.  Patient is not reporting any major medical issues at this time.      Dimension #3 - Emotional/Behavioral/Cognitive - Risk 2.  The patient has no formal diagnoses of mental illnesses.  Patient attended both groups on time this week and honestly sharing his struggle with cravings. PT states  his last use was on 5/7 and admitted his use to his . Pt seems he is getting comfortable with group members and appreciated older people's feedbacks.    Specific goals from treatment plan addressed this week: Attend all CD groups and start reading tutoring.    Effectiveness of strategies:  Effective.   Patient attended both groups this week and talking more in group.     Dimension #4 - Treatment Acceptance/Resistance - Risk 3.  [facility] KAYLYNN, Shayla Addison.    Specific goals from treatment plan addressed this week: Patient to attend all groups and learn to cope with cravings    Effectiveness of strategies: Effective.  Pt states it helps to attend groups and listen to other older people's life experience and feedbacks.  Pt states he decided to cut down his visit to his friends who all smkes marijuana.      Dimension #5 - Relapse Potential - Risk 3. Last use 5/7 or 5/8.  Pt admits he is still struggling with cravings.  Pt also reports his social setting involves marijuana use and he had to decide a decision to not see his friends.    Specific goals from treatment plan addressed this week: Relapse prevention skills    Effectiveness of strategies: Patient is attending groups, sharing his struggle and trying out new ways to stay sober.      Dimension #6 - Recovery Environment - Risk 2.  Patient has stable housing and good family support.  Patient is on social security disability and receives $703/month. 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  The record shows patient goes to 12 Step meeting in [place]. Patient is single, no children and states he would like to stay that way to focus on his own life for now. Pt states he has an extensive supportive family.  Patient reports his old using friends are accepting his sobriety and not bothering him.  Patient states he is telling everyone that he is no longer using and  setting boundaries.    Specific goals from treatment plan addressed this week:  Develop and maintain sober support network.    Effectiveness of strategies: Patient seems he is learning healthy way to set boundaries with others and working on self care.       T) Treatment plan updated: N/A. Patient notified and in agreement: Yes   Patient was educated on Sexual Health and Medication Compilance.   Patient has completed 75 of 220 program hours at this time. Projected discharge date is 5/8/2018. Current discharge plan is TBD.      REG Nathan      Psycho-Educational Curriculum  Date Attended  Psycho-Educational Curriculum  Date Attended    Acceptance   Shame/Guilt     1st Step   Anger/Rage     Affirmations  4/24/18, 4/26/18 Mental Health     Automatic Negative Thoughts   Anxiety     Cross Addiction  2/20, 2/22  Co-Occurring Disorders     Stages of Change  4/10/, 4/12  Carolyn/Bipolar     Relapse   Trauma   2/14, 2/15    Addictive Thoughts  3/13. 3/15  Victim Identity     Coping Skills   Sober Structure     Relapse Prevention   Continuum of Care     Medical Aspects   Non-12 Step Support     Brain/Neurotransmitters   Priorities     Medication Compliance  5/15  Spirituality  2/27, 3/1    ADELE Alcohol/Drug Research   Weekend Planner     Physical Health   Educational Videos     Post Acute Withdrawal   1st Step     Pregnancy and Drug Use   2nd Step     Sexual Health  5/15 Assertive Communication     Short-Term/Long-Term Effects   My name is Jun Nicholson  3/29 Cross Addiction     Assertive Communication  3/20, 3/22,  God As We Understood Him     Boundaries  3/20, 3/22  HBO Relapse     Codependence   3/20, 3/22  HBO What Is Addiction     Defense Mechanisms  3/6/18, 3/8/18 Medical Aspects 1     Family Roles   Medical Aspects 2     Goodbye Letter   National Geographic: Stress     Intimacy  5/10 PBS Depression Out of the Shadows     Short term and long term effect  The Anonymous People    Socialization Skills  " 2/8/18, 3/27/18 [place]     Feelings  4/3, 4/5  Julian Jean \"Highjacked Brain\"    ABC Model of Emotion   Rambo Stone Humor in [place]    Grief and Loss   The Mindfulness Movie    Healthy vs. Unhealthy Feelings   Jun GARCIA documentary     Meditation/Mindfulness   Nicotine Cessation  5/3   Overconfidence/Complacency       Resentments       Stress           "

## 2021-06-18 NOTE — PROGRESS NOTES
D) Patient attended 3 hours of CD Outpatient/Service Coordination group today  A) Registered nurse came in and presented health education.

## 2021-06-18 NOTE — PROGRESS NOTES
Weekly Progress Note  Patient Name: Tyler Ramsey   : 1985   MRN: [id number]    D) Patient attended 2/2 groups this week with no absence. Patient had no  individual sessions this week. A) Staff facilitated groups and reviewed tx progress. Assessed for [facility]. R) No VAP needed at this time.   Any significant events, defines as events that impact patient s relationship with others inside and outside of treatment: no   Indicate any changes or monitoring of physical or mental health problems: no     Indicate involvement by any outside supports: 12 Step meeting at [facility] reviewed and modified as needed. N/A   Pt working on the following dimensions:  Dimension #1 - Withdrawal Potential - Risk 0.  Patient's UAs have been clean since .   No withdrawal concern reported by patient or observed.  Sober 18 days as of .    Specific goals from treatment plan addressed this week:  Turn in clean UA   Effectiveness of strategies: Effective.  UA finally came clean.      Dimension #2 - Biomedical - Risk 0. No medical issues reported by patient, however patient admits he is still smokes 3-4 cigars ad week.  Pt reports he is working toward reducing cigar use.    Specific goals from treatment plan addressed this week: Pt reports he goes to [employer] to exercise.  Primary care established at [facility] with Dr. Roberto Gunter.    Effectiveness of strategies: Effective.  Patient has health insurance to access medical care if necessary.  Patient is not reporting any major medical issues at this time.      Dimension #3 - Emotional/Behavioral/Cognitive - Risk 2.  The patient has no formal diagnoses of mental illnesses.  Patient made an arrangement to get a help from an older group member and learn to read.  Patient kept talking about his need to improve his literacy but admitted he had been procrastinating and overwhelmed.   Patient will start the short reading session beginning next week.    Specific  goals from treatment plan addressed this week: Attend all CD groups and start reading tutoring.    Effectiveness of strategies:  Effective. Patient made a plan to improve his reading skill.  Pt attended both CD groups this week.      Dimension #4 - Treatment Acceptance/Resistance - Risk 2.  [facility] Probation mandated to finish treatment. PO, Shayla Addison.  Probation notified of UA finally clean after 1 months of positive THC.    Specific goals from treatment plan addressed this week: Patient to attend all groups and learn to cope with cravings    Effectiveness of strategies:  Patient attended both groups this week and made an arrangement to zayas working on his reading skills.  Patient UA have been clean.      Dimension #5 - Relapse Potential - Risk 2. Last use 5/14.  Pt is early in his sobriety. Patient identifies his trigger as using friends and trying to not associate with them.   After months of goal identifying and sharing in group that he wanted to improve his reading skills, patient finally made a move to get help from a group member.  The session starts next week.  Patient's UA are coming clean and it seems patient is finally ready to work on his goals.    Specific goals from treatment plan addressed this week: Relapse prevention skills    Effectiveness of strategies: Patient states he is sober for 18 days this week.      Dimension #6 - Recovery Environment - Risk 2.  Patient has stable housing and good family support.  Patient is on social security disability and receives $703/month. 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  The record shows patient goes to 12 Step meeting in [place]. Patient is single, no children and states he would like to stay that way to focus on his own life for now. Pt states he has an extensive supportive family.  Patient reports his old using friends are accepting his sobriety and not bothering him.  Patient states he is telling  everyone that he is no longer using and setting boundaries.    Specific goals from treatment plan addressed this week:  Develop and maintain sober support network.    Effectiveness of strategies: Patient seems he is learning healthy way to set boundaries with others and working on self care.       T) Treatment plan updated: N/A. Patient notified and in agreement: Yes   Patient was educated on Goodbye Letter.   Patient has completed 87 of 226 program hours at this time. Projected discharge date is 5/8/2018. Current discharge plan is TBD.      REG Nathan      Psycho-Educational Curriculum  Date Attended  Psycho-Educational Curriculum  Date Attended    Acceptance   Shame/Guilt     1st Step   Anger/Rage     Affirmations  4/24/18, 4/26/18 Mental Health     Automatic Negative Thoughts   Anxiety     Cross Addiction  2/20, 2/22  Co-Occurring Disorders     Stages of Change  4/10/, 4/12  Carolyn/Bipolar     Relapse   Trauma   2/14, 2/15    Addictive Thoughts  3/13. 3/15  Victim Identity     Coping Skills   Sober Structure     Relapse Prevention   Continuum of Care     Medical Aspects   Non-12 Step Support     Brain/Neurotransmitters   Priorities     Medication Compliance  5/15  Spirituality  2/27, 3/1    ADELE Alcohol/Drug Research   Weekend Planner     Physical Health   Educational Videos     Post Acute Withdrawal   1st Step     Pregnancy and Drug Use   2nd Step     Sexual Health  5/15 Assertive Communication     Short-Term/Long-Term Effects   My name is Jun Nicholson  3/29 Cross Addiction     Assertive Communication  3/20, 3/22,  God As We Understood Him     Boundaries  3/20, 3/22  HBO Relapse     Codependence   3/20, 3/22  HBO What Is Addiction     Defense Mechanisms  3/6/18, 3/8/18 Medical Aspects 1     Family Roles   Medical Aspects 2     Goodbye Letter  6/5, 5/7  National Geographic: Stress     Intimacy  5/10 PBS Depression Out of the Shadows     Short term and long term effect  The Anonymous People  "   Socialization Skills  2/8/18, 3/27/18 [place]     Feelings  4/3, 4/5  Julian Jean \"Highjacked Brain\"    ABC Model of Emotion   Rambo Stone Humor in Tx    Grief and Loss  5/29  The Mindfulness Movie    Healthy vs. Unhealthy Feelings   Jun GARCIA documentary     Meditation/Mindfulness   Nicotine Cessation  5/3   Overconfidence/Complacency  5/22/18, 5/24/18      Resentments       Stress           "

## 2021-06-18 NOTE — PROGRESS NOTES
D) Patient completed 3 hours of CD Outpatient/Service Coordination group today.    A) Pt reports he hs 25 days sobriety.

## 2021-06-18 NOTE — PROGRESS NOTES
"D) Met with patient for 45 min for progress check   A) Patient's UA finally came back clean yesterday.  Patient previously reported he has been struggling to quit marijuana since 4/20 but said in yesterday's group that he was sober for 10 days or so.  Yesterday's clean UA proves patient is making progress toward sobriety.  We talked about goals.  Patient's ultimate goals in life remains opening a small business.  Pt states it will be either opening a small restaurant or designing clothes.  With this future goal in mind, we talked about [employer] goals.  Patient struggles with reading and believes his reading level is at 2nd or 3rd grade. This writer asked patient to read a short 2nd grade sentence, \"Animals live in many places-in water, grass, and trees.  Some hop, some jump, some climb, while others run with ease.\" Patient read very slowly and stumbled with few words.  Patient admits going to library and getting  is overwhelming and he has been procrastinating.  Patient was asked if he would get help from a retired group member with college degree in journalism who is trying to fill time and help others.  Patient said he will appreciate the help.  This writer will ask the other group member to find out if he is still available.  Contacted  and informed of the clean UA. PO was pleased with the outcome and encouraged patient to stay sober.  Patient has 200 hours of group hours approved by [facility].  Patient spent about 85 hours so far.  With his recent relapse, his discharge date is pushed further. Patient states he is not in hurry to graduate. PO states patient does not have anything else to do and suggests to remain in CD group and gain more sobriety skills.  PO meeting scheduled for 615 ain the morning at [facility].  Patient updated his life in general. Pt states he has a [facility]  Ran and gets home visits every Thursday morning.  Pt states he has been trying to stay sober by " playing TV games, walking, and taking naps.  Pt states he has been eating more as well.  Pt states his  makes sure patient is taking medications, has groceries, and keeping up with cleaning the house.   Pt also states CM help him with understanding mails and privatizing payments.

## 2021-06-18 NOTE — PROGRESS NOTES
D)Left a message to patient  A) Asked why he missed the group yesterday.  Pt said he got mixed up with the dates yesterday and he was late for Tuesday group because he got stuck at  lab.      T) Pt states he is still sober.  Pt was encouraged to be at the group on time

## 2021-06-19 NOTE — PROGRESS NOTES
LATE ENTRY FOR WEEK OF 8/10/18      Weekly Progress Note  Patient Name: Tyler Ramsey   : 1985   MRN: [id number]    D) Patient attended 1/2 groups this week with one absence. Patient had no  individual sessions this week. A) Staff facilitated groups and reviewed tx progress. Assessed for [facility]. R) No VAP needed at this time.   Any significant events, defines as events that impact patient s relationship with others inside and outside of treatment: none reported  Indicate any changes or monitoring of physical or mental health problems: no     Indicate involvement by any outside supports: 12 Step meeting at [facility] reviewed and modified as needed. N/A   Pt working on the following dimensions  Dimension #1 - Withdrawal Potential - Risk 0.  Patient's UAs have been clean since .    No withdrawal concern reported by patient or observed.    Specific goals from treatment plan addressed this week:  Turn in clean UA   Effectiveness of strategies: Effective.       Dimension #2 - Biomedical - Risk 0. No medical issues reported by patient.  On going cigarette use.    Specific goals from treatment plan addressed this week: Pt reports he goes to [employer] to exercise.   Effectiveness of strategies: Effective.  Patient has health insurance to access medical care if necessary.  Patient is not reporting any major medical issues at this time.      Dimension #3 - Emotional/Behavioral/Cognitive - Risk 2.  The patient has no formal diagnoses of mental illnesses. Patient attended only 1 hour of group this week.  Pt called in later to explained why he left the group early on Tuesday and why he skipped the Thursday group.    Specific goals from treatment plan addressed this week: Attend all CD groups    Effectiveness of strategies:  Patient seems he is not utilizing group for when it is most appropriate.     Dimension #4 - Treatment Acceptance/Resistance - Risk 2.  [facility] PO, Shayla Addison.  UA clean since 5/29.    Specific goals from treatment plan addressed this week: Patient to comply with probation and treatment requirements.    Effectiveness of strategies: UA have been clean since 5/29 but his group attendance has gotten sporadic     Dimension #5 - Relapse Potential - Risk 2.   Pt identifies his triggers are some using friends and staying away from them at this time.  Pt sees vague on his triggers and can create more concrete prevention plan.  Pt previously identified that not reading well causes some self esteem issues.    Specific goals from treatment plan addressed this week: Relapse prevention skills and improving self confidence by gaining  reading skills   Effectiveness of strategies:   Pt  has not initiated reading tutoring yet.      Dimension #6 - Recovery Environment - Risk 2.  Patient has stable housing and good family support.  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 Pt identifies he has to learn to read before he can achieve his goal of becoming a business owner.      Pt lives in a subsidized apartment and has worked in the past occasionally for landscape and construction.  Pt currently does not have any job and his days lacks structure. The record shows patient goes to 12 Step meeting in [place]. Patient is single, no children and states he would like to stay that way to focus on his own life for now. Pt states he has an supportive family.     Specific goals from treatment plan addressed this week:  Develop and maintain sober support network.    Effectiveness of strategies:  Effective.  Patient can benefit from gaining more structure in life.      T) Treatment plan updated: N/A. Patient notified and in agreement: Yes   Patient was educated on Automatic Negative Thoughts.   Patient has completed 120 of 226 program hours at this time. Projected discharge date is 12/31/2018.  "Current discharge plan is TBD.      Dana Hutchison MA, [facility]        Psycho-Educational Curriculum  Date Attended  Psycho-Educational Curriculum  Date Attended    Acceptance   Shame/Guilt     1st Step   Anger/Rage     Affirmations  4/24/18, 4/26/18 Mental Health  7/24/18    Automatic Negative Thoughts  7/31/18  Anxiety     Cross Addiction  2/20, 2/22  Co-Occurring Disorders     Stages of Change  4/10/, 4/12  Carolyn/Bipolar     Relapse   Trauma   2/14, 2/15    Addictive Thoughts  3/13. 3/15  Victim Identity     Coping Skills  8/7 Sober Structure  6/12   Relapse Prevention   Continuum of Care     Medical Aspects   Non-12 Step Support     Brain/Neurotransmitters   Priorities     Medication Compliance  5/15  Spirituality  2/27, 3/1    ADELE Alcohol/Drug Research   Weekend Planner     Physical Health   Educational Videos     Post Acute Withdrawal   1st Step     Pregnancy and Drug Use   2nd Step     Sexual Health  5/15 Assertive Communication     Short-Term/Long-Term Effects   My name is Jun GARCIA    Relationships  3/29 Cross Addiction     Assertive Communication  3/20, 3/22,  God As We Understood Him     Boundaries  3/20, 3/22  HBO Relapse     Codependence   3/20, 3/22  HBO What Is Addiction     Defense Mechanisms  3/6/18, 3/8/18 Medical Aspects 1     Family Roles   Medical Aspects 2     Goodbye Letter  6/5, 5/7  National Geographic: Stress     Intimacy  5/10 PBS Depression Out of the Shadows     Identifying Self 8/16/18 The Anonymous People    Socialization Skills  2/8/18, 3/27/18 [place]     Feelings  4/3, 4/5  Julian Jean \"Highjacked Brain\"    ABC Model of Emotion   Rambo Stone Humor in Tx    Grief and Loss  5/29  The Mindfulness Movie    Healthy vs. Unhealthy Feelings   Jun GARCIA documentary     Meditation/Mindfulness   Nicotine Cessation  5/3/18   Overconfidence/Complacency  5/22/18, 5/24/18  Masks  6/19/18, 6/21/18    Resentments   8 Dimensions of Wellness by [facility]    Relationship  7/12/18  Holiday and Stress  7/3/18, " 7/5/18

## 2021-06-19 NOTE — PROGRESS NOTES
D)Patient left a message at 12:51pm after group started.    A) Pt explained and apologized for leaving the last group abruptly.  Pt said he received a phone call from his friend about friend's mother's passing.  Pt said he was unable to attend group today because he was helping the friend with arrangement.    T)Pt said he would resume group next week.

## 2021-06-19 NOTE — PROGRESS NOTES
Patient came to group on time and left at 1:40 pm.  Pt did not return to group.  PT completed 1 hour of CD Outpatient/Service Coordination group today.

## 2021-06-19 NOTE — PROGRESS NOTES
Weekly Progress Note  Patient Name: Tyler Ramsey   : 1985   MRN: [id number]    D) Patient attended 2/3 groups this week with one absence. Patient had no  individual sessions this week. A) Staff facilitated groups and reviewed tx progress. Assessed for [facility]. R) No VAP needed at this time.   Any significant events, defines as events that impact patient s relationship with others inside and outside of treatment: no   Indicate any changes or monitoring of physical or mental health problems: no     Indicate involvement by any outside supports: 12 Step meeting at [facility] reviewed and modified as needed. N/A   Pt working on the following dimensions:  Dimension #1 - Withdrawal Potential - Risk 0.  Patient's UAs have been clean since .   Two UAs this week and both clean and patient celebrated 30 days sobriety.  No withdrawal concern reported by patient or observed.    Specific goals from treatment plan addressed this week:  Turn in clean UA   Effectiveness of strategies: Effective.       Dimension #2 - Biomedical - Risk 0. No medical issues reported by patient, however patient admits he is still smokes 3-4 cigars ad week.  Pt reports he is working toward reducing cigar use.    Specific goals from treatment plan addressed this week: Pt reports he goes to [employer] to exercise.  Primary care established at [facility] with Dr. Roberto Gunter.    Effectiveness of strategies: Effective.  Patient has health insurance to access medical care if necessary.  Patient is not reporting any major medical issues at this time.      Dimension #3 - Emotional/Behavioral/Cognitive - Risk 1.  The patient has no formal diagnoses of mental illnesses. Patient continues to attend regular Tuesday and Thursday groups.  Pt continues to struggle with reading but he is able to participate in group verbally. Pt seems he is learning to meditate well in group.     Specific goals from treatment plan addressed this week:  Attend all CD groups    Effectiveness of strategies:  Effective.  Patient missed the recreational groups but he attended both of the regular CD groups this week.     Dimension #4 - Treatment Acceptance/Resistance - Risk 0.  [facility] Probation mandated to finish treatment. Shayla CHAIDEZ.  UA clean since 5/29.    Specific goals from treatment plan addressed this week: Patient to comply with probation and treatment requirements.    Effectiveness of strategies:  Effective.      Dimension #5 - Relapse Potential - Risk 2.  Sober for a month.  Pt identifies his triggers are some using friends and staying away from them at this time.    Specific goals from treatment plan addressed this week: Relapse prevention skills and improving self confidence.      Effectiveness of strategies:   Effective. Patient seems he is practicing new awareness into action to stay sober.      Dimension #6 - Recovery Environment - Risk 2.  Patient has stable housing and good family support.  Patient is on social security disability and receives $703/month. 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  The record shows patient goes to 12 Step meeting in [place]. Patient is single, no children and states he would like to stay that way to focus on his own life for now. Pt states he has an extensive supportive family.  Patient reports his old using friends are accepting his sobriety and not bothering him.  Patient states he is telling everyone that he is no longer using and setting boundaries.    Specific goals from treatment plan addressed this week:  Develop and maintain sober support network.    Effectiveness of strategies: Patient seems he is learning healthy way to set boundaries with others and working on self care.       T) Treatment plan updated: N/A. Patient notified and in agreement: Yes   Patient was educated on 8 Dimensions of Wellness Modal by [facility].   Patient has completed 101 of 226  "program hours at this time. Projected discharge date is 5/8/2018. Current discharge plan is TBD.      REG Nathan      Psycho-Educational Curriculum  Date Attended  Psycho-Educational Curriculum  Date Attended    Acceptance   Shame/Guilt     1st Step   Anger/Rage     Affirmations  4/24/18, 4/26/18 Mental Health     Automatic Negative Thoughts   Anxiety     Cross Addiction  2/20, 2/22  Co-Occurring Disorders     Stages of Change  4/10/, 4/12  Carolyn/Bipolar     Relapse   Trauma   2/14, 2/15    Addictive Thoughts  3/13. 3/15  Victim Identity     Coping Skills   Sober Structure  6/12   Relapse Prevention   Continuum of Care     Medical Aspects   Non-12 Step Support     Brain/Neurotransmitters   Priorities     Medication Compliance  5/15  Spirituality  2/27, 3/1    ADELE Alcohol/Drug Research   Weekend Planner     Physical Health   Educational Videos     Post Acute Withdrawal   1st Step     Pregnancy and Drug Use   2nd Step     Sexual Health  5/15 Assertive Communication     Short-Term/Long-Term Effects   My name is Jun GARCIA    Relationships  3/29 Cross Addiction     Assertive Communication  3/20, 3/22,  God As We Understood Him     Boundaries  3/20, 3/22  HBO Relapse     Codependence   3/20, 3/22  HBO What Is Addiction     Defense Mechanisms  3/6/18, 3/8/18 Medical Aspects 1     Family Roles   Medical Aspects 2     Goodbye Letter  6/5, 5/7  National Geographic: Stress     Intimacy  5/10 PBS Depression Out of the Shadows     Short term and long term effect  The Anonymous People    Socialization Skills  2/8/18, 3/27/18 [place]     Feelings  4/3, 4/5  Julian Jean \"Highjacked Brain\"    ABC Model of Emotion   Rambo Stone Humor in Tx    Grief and Loss  5/29  The Mindfulness Movie    Healthy vs. Unhealthy Feelings   Jun GARCIA documentary     Meditation/Mindfulness   Nicotine Cessation  5/3/18   Overconfidence/Complacency  5/22/18, 5/24/18  Masks  6/19/18, 6/21/18    Resentments   8 Dimensions [facility] Wellness [facility]  "   Stress

## 2021-06-19 NOTE — PROGRESS NOTES
D) Patient completed 3 hours of CD Outpatient/Service Coordination group today.    A) Pt reported he has 30 days sobriety.

## 2021-06-19 NOTE — PROGRESS NOTES
Tyler Ramsey attended 3 hours of group therapy today.    Total group size of 7.    8/16/2018 3:49 PM Dana Hutchison

## 2021-06-19 NOTE — PROGRESS NOTES
Weekly Progress Note  Patient Name: Tyler Ramsey   : 1985   MRN: [id number]    D) Patient attended 1/2 groups this week with one absence. Patient had no  individual sessions this week. A) Staff facilitated groups and reviewed tx progress. [facility] for [facility]. R) No VAP needed at this time.   Any significant events, defines as events that impact patient s relationship with others inside and outside of treatment: Pt reports that he learned of his friend's mother's death.    Indicate any changes or monitoring of physical or mental health problems: no     Indicate involvement by any outside supports: 12 Step meeting at [facility] reviewed and modified as needed. N/A   Pt working on the following dimensions  Dimension #1 - Withdrawal Potential - Risk 0.  Patient's UAs have been clean since .    No withdrawal concern reported by patient or observed.    Specific goals from treatment plan addressed this week:  Turn in clean UA   Effectiveness of strategies: Effective.       Dimension #2 - Biomedical - Risk 0. No medical issues reported by patient.  On going cigarette use.    Specific goals from treatment plan addressed this week: Pt reports he goes to [employer] to exercise.    Effectiveness of strategies: Effective.  Patient has health insurance to access medical care if necessary.  Patient is not reporting any major medical issues at this time.      Dimension #3 - Emotional/Behavioral/Cognitive - Risk 2.  The patient has no formal diagnoses of mental illnesses. Patient attended only 3 hour of group this week. Ct does not share much in group and reports that he usually has a pretty even mood and is not easily frustrated.    Specific goals from treatment plan addressed this week: 1 CD group    Effectiveness of strategies:  Patient seems he is not utilizing group for when it is most appropriate.     Dimension #4 - Treatment Acceptance/Resistance - Risk 2.  [facility] PO, Shayla Addison.  UA clean since 5/29.    Specific goals from treatment plan addressed this week: Patient to comply with probation and treatment requirements.    Effectiveness of strategies: UA have been clean since 5/29 but his group attendance has gotten sporadic     Dimension #5 - Relapse Potential - Risk 2.   Pt identifies his triggers are some using friends and staying away from them at this time.  Pt sees vague on his triggers and can create more concrete prevention plan.  Pt previously identified that not reading well causes some self esteem issues.    Specific goals from treatment plan addressed this week: Relapse prevention skills and improving self confidence by gaining  reading skills   Effectiveness of strategies:   Pt  has not initiated reading tutoring yet.      Dimension #6 - Recovery Environment - Risk 2.  Patient has stable housing and good family support.  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 Pt identifies he has to learn to read before he can achieve his goal of becoming a business owner.      Pt lives in a subsidized apartment and has worked in the past occasionally for landscape and construction.  Pt currently does not have any job and his days lacks structure. The record shows patient goes to 12 Step meeting in [place]. Patient is single, no children and states he would like to stay that way to focus on his own life for now. Pt states he has an supportive family.     Specific goals from treatment plan addressed this week:  Develop and maintain sober support network.    Effectiveness of strategies:  Effective.  Patient can benefit from gaining more structure in life.      T) Treatment plan updated: N/A. Patient notified and in agreement: Yes   Patient was educated on Triggers and High Risk Emotions.   Patient has completed 117 of 226 program hours at this time. Projected discharge date is  "12/31/2018. Current discharge plan is TBD.      Dana Hutchison MA, [facility]        Psycho-Educational Curriculum  Date Attended  Psycho-Educational Curriculum  Date Attended    Acceptance   Shame/Guilt     1st Step   Anger/Rage     Affirmations  4/24/18, 4/26/18 Mental Health  7/24/18    Automatic Negative Thoughts  7/31/18  Anxiety     Cross Addiction  2/20, 2/22  Co-Occurring Disorders     Stages of Change  4/10/, 4/12  Carolyn/Bipolar     Relapse   Trauma   2/14, 2/15    Addictive Thoughts  3/13. 3/15  Victim Identity     Coping Skills   Sober Structure  6/12   Relapse Prevention  8/7/18 Continuum of Care     Medical Aspects   Non-12 Step Support     Brain/Neurotransmitters   Priorities     Medication Compliance  5/15  Spirituality  2/27, 3/1    ADELE Alcohol/Drug Research   Weekend Planner     Physical Health   Educational Videos     Post Acute Withdrawal   1st Step     Pregnancy and Drug Use   2nd Step     Sexual Health  5/15 Assertive Communication     Short-Term/Long-Term Effects   My name is Jun GARCIA    Relationships  3/29 Cross Addiction     Assertive Communication  3/20, 3/22,  God As We Understood Him     Boundaries  3/20, 3/22  HBO Relapse     Codependence   3/20, 3/22  HBO What Is Addiction     Defense Mechanisms  3/6/18, 3/8/18 Medical Aspects 1     Family Roles   Medical Aspects 2     Goodbye Letter  6/5, 5/7  National Geographic: Stress     Intimacy  5/10 PBS Depression Out of the Shadows     Short term and long term effect  The Anonymous People    Socialization Skills  2/8/18, 3/27/18 [place]     Feelings  4/3, 4/5  Julian Jean \"Highjacked Brain\"    ABC Model of Emotion   Rambo Stone Humor in Tx    Grief and Loss  5/29  The Mindfulness Movie    Healthy vs. Unhealthy Feelings   Jun GARCIA documentary     Meditation/Mindfulness   Nicotine Cessation  5/3/18   Overconfidence/Complacency  5/22/18, 5/24/18  Masks  6/19/18, 6/21/18    Resentments   8 Dimensions of Wellness by [facility]    Relationship  7/12/18  " Holiday and Stress  7/3/18, 7/5/18

## 2021-06-19 NOTE — PROGRESS NOTES
Weekly Progress Note  Patient Name: Tyler Ramsey   : 1985   MRN: [id number]    D) Patient attended 2/3 groups this week with one absence. Patient had no  individual sessions this week. A) Staff facilitated groups and reviewed tx progress. Assessed for [facility]. R) No VAP needed at this time.   Any significant events, defines as events that impact patient s relationship with others inside and outside of treatment: no   Indicate any changes or monitoring of physical or mental health problems: no     Indicate involvement by any outside supports: 12 Step meeting at [facility] reviewed and modified as needed. N/A   Pt working on the following dimensions:  Dimension #1 - Withdrawal Potential - Risk 0.  Patient's UAs have been clean since .    No withdrawal concern reported by patient or observed.    Specific goals from treatment plan addressed this week:  Turn in clean UA   Effectiveness of strategies: Effective.       Dimension #2 - Biomedical - Risk 0. No medical issues reported by patient, however patient admits he is still smokes 3-4 cigars ad week.  Pt reports he is working toward reducing cigar use.    Specific goals from treatment plan addressed this week: Pt reports he goes to [employer] to exercise.  Primary care established at [facility] with Dr. Roberto Gunter.    Effectiveness of strategies: Effective.  Patient has health insurance to access medical care if necessary.  Patient is not reporting any major medical issues at this time.      Dimension #3 - Emotional/Behavioral/Cognitive - Risk 1.  The patient has no formal diagnoses of mental illnesses. Patient continues to attend both Tuesday and Thursday groups.  This week, patient started reading Check In sheet loudly.  He made some mistake, but he read it out loud. Pt seems he is gaining some confidence.    Specific goals from treatment plan addressed this week: Attend all CD groups    Effectiveness of strategies:  Effective.   Patient began different approach on reading.     Dimension #4 - Treatment Acceptance/Resistance - Risk 0.  [facility] Probation mandated to finish treatment. PO, Shayla Addison.  UA clean since 5/29.    Specific goals from treatment plan addressed this week: Patient to comply with probation and treatment requirements.    Effectiveness of strategies:  Effective.      Dimension #5 - Relapse Potential - Risk 2.  Sober for a month.  Pt identifies his triggers are some using friends and staying away from them at this time.  Patient was able to maintain sober on 4th of July.    Specific goals from treatment plan addressed this week: Relapse prevention skills and improving self confidence.      Effectiveness of strategies:   Effective. Patient seems he is practicing new awareness into action to stay sober.      Dimension #6 - Recovery Environment - Risk 1.  Patient has stable housing and good family support.  Patient is on social security disability and receives $703/month. 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  The record shows patient goes to 12 Step meeting in [place]. Patient is single, no children and states he would like to stay that way to focus on his own life for now. Pt states he has an supportive family.   Specific goals from treatment plan addressed this week:  Develop and maintain sober support network.    Effectiveness of strategies:  Effective.  Patient can benefit from gaining more structure in life.      T) Treatment plan updated: N/A. Patient notified and in agreement: Yes   Patient was educated on Holiday and Stress.   Patient has completed 107 of 226 program hours at this time. Projected discharge date is 5/8/2018. Current discharge plan is TBD.      REG Nathan      Psycho-Educational Curriculum  Date Attended  Psycho-Educational Curriculum  Date Attended    Acceptance   Shame/Guilt     1st Step   Anger/Rage     Affirmations  4/24/18,  "4/26/18 Mental Health     Automatic Negative Thoughts   Anxiety     Cross Addiction  2/20, 2/22  Co-Occurring Disorders     Stages of Change  4/10/, 4/12  Carolyn/Bipolar     Relapse   Trauma   2/14, 2/15    Addictive Thoughts  3/13. 3/15  Victim Identity     Coping Skills   Sober Structure  6/12   Relapse Prevention   Continuum of Care     Medical Aspects   Non-12 Step Support     Brain/Neurotransmitters   Priorities     Medication Compliance  5/15  Spirituality  2/27, 3/1    ADELE Alcohol/Drug Research   Weekend Planner     Physical Health   Educational Videos     Post Acute Withdrawal   1st Step     Pregnancy and Drug Use   2nd Step     Sexual Health  5/15 Assertive Communication     Short-Term/Long-Term Effects   My name is Jun GARCIA    Relationships  3/29 Cross Addiction     Assertive Communication  3/20, 3/22,  God As We Understood Him     Boundaries  3/20, 3/22  HBO Relapse     Codependence   3/20, 3/22  HBO What Is Addiction     Defense Mechanisms  3/6/18, 3/8/18 Medical Aspects 1     Family Roles   Medical Aspects 2     Goodbye Letter  6/5, 5/7  National Geographic: Stress     Intimacy  5/10 PBS Depression Out of the Shadows     Short term and long term effect  The Anonymous People    Socialization Skills  2/8/18, 3/27/18 [place]     Feelings  4/3, 4/5  Julian Jean \"Highjacked Brain\"    ABC Model of Emotion   Rambo Stone Humor in [place]x    Grief and Loss  5/29  The Mindfulness Movie    Healthy vs. Unhealthy Feelings   Jun GARCIA documentary     Meditation/Mindfulness   Nicotine Cessation  5/3/18   Overconfidence/Complacency  5/22/18, 5/24/18  Masks  6/19/18, 6/21/18    Resentments   8 Dimensions of Wellness by [facility]    Stress   Holiday and Stress  7/3/18, 7/5/18       "

## 2021-06-19 NOTE — PROGRESS NOTES
D)Pt met with this writer briefly after the group yesterday.  Pt does not have primary doctor and asked this writer to set one up  T) This writer called [facility] on 8/1 at 4pm

## 2021-06-19 NOTE — PROGRESS NOTES
Weekly Progress Note  Patient Name: Tyler Ramsey   : 1985   MRN: [id number]    D) Patient attended 1/3 groups this week with two absence. Patient had no  individual sessions this week. A) Staff facilitated groups and reviewed tx progress. Assessed for [facility]. R) No VAP needed at this time.   Any significant events, defines as events that impact patient s relationship with others inside and outside of treatment: no   Indicate any changes or monitoring of physical or mental health problems: no     Indicate involvement by any outside supports: 12 Step meeting at [facility] reviewed and modified as needed. N/A   Pt working on the following dimensions  Dimension #1 - Withdrawal Potential - Risk 0.  Patient's UAs have been clean since .    No withdrawal concern reported by patient or observed.    Specific goals from treatment plan addressed this week:  Turn in clean UA   Effectiveness of strategies: Effective.       Dimension #2 - Biomedical - Risk 0. No medical issues reported by patient.  On going cigarette use.    Specific goals from treatment plan addressed this week: Pt reports he goes to [employer] to exercise.  Primary care scheduled on  at [facility] with Dr. Roberto Gunter.    Effectiveness of strategies: Effective.  Patient has health insurance to access medical care if necessary.  Patient is not reporting any major medical issues at this time.      Dimension #3 - Emotional/Behavioral/Cognitive - Risk 1.  The patient has no formal diagnoses of mental illnesses. Patient's group attendance started to suffer.  Pt was no call and no show to Thursday group, but his UA was clean on Tuesday.  Pt is able to participate fully when he comes.  Pt has not initiated  yet but started to read in group.    Specific goals from treatment plan addressed this week: Attend all CD groups    Effectiveness of strategies:  Pending.  Patient's group attendance has become sporadic.         Dimension #4 - Treatment Acceptance/Resistance - Risk 2.  [facility] Probation mandated to finish treatment. PO, Shayla Addison.  UA clean since 5/29.    Specific goals from treatment plan addressed this week: Patient to comply with probation and treatment requirements.    Effectiveness of strategies: UA have been clean since 5/29 but his group attendance has gotten sporadic     Dimension #5 - Relapse Potential - Risk 2.   Pt identifies his triggers are some using friends and staying away from them at this time.    Specific goals from treatment plan addressed this week: Relapse prevention skills and improving self confidence by gaining  reading skills   Effectiveness of strategies:   Pt has gained sobriety but he has not initiated reading tutoring yet.      Dimension #6 - Recovery Environment - Risk 2.  Patient has stable housing and good family support.  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 Pt identifies he has to learn to read before he can achieve his goal of becoming a business owner.      Pt lives in a subsidized apartment and has worked in the past occasionally for landscape and construction.  Pt currently does not have any job and his days lacks structure. The record shows patient goes to 12 Step meeting in [place]. Patient is single, no children and states he would like to stay that way to focus on his own life for now. Pt states he has an supportive family.     Specific goals from treatment plan addressed this week:  Develop and maintain sober support network.    Effectiveness of strategies:  Effective.  Patient can benefit from gaining more structure in life.      T) Treatment plan updated: N/A. Patient notified and in agreement: Yes   Patient was educated on Mental Heatlh.   Patient has completed 113 of 226 program hours at this time. Projected discharge date is 12/31/2018. Current discharge plan is LE[facility] Thomas  "REG Heredia      Psycho-Educational Curriculum  Date Attended  Psycho-Educational Curriculum  Date Attended    Acceptance   Shame/Guilt     1st Step   Anger/Rage     Affirmations  4/24/18, 4/26/18 Mental Health  7/24/18    Automatic Negative Thoughts   Anxiety     Cross Addiction  2/20, 2/22  Co-Occurring Disorders     Stages of Change  4/10/, 4/12  Carolyn/Bipolar     Relapse   Trauma   2/14, 2/15    Addictive Thoughts  3/13. 3/15  Victim Identity     Coping Skills   Sober Structure  6/12   Relapse Prevention   Continuum of Care     Medical Aspects   Non-12 Step Support     Brain/Neurotransmitters   Priorities     Medication Compliance  5/15  Spirituality  2/27, 3/1    ADELE Alcohol/Drug Research   Weekend Planner     Physical Health   Educational Videos     Post Acute Withdrawal   1st Step     Pregnancy and Drug Use   2nd Step     Sexual Health  5/15 Assertive Communication     Short-Term/Long-Term Effects   My name is Jun GARCIA    Relationships  3/29 Cross Addiction     Assertive Communication  3/20, 3/22,  God As We Understood Him     Boundaries  3/20, 3/22  HBO Relapse     Codependence   3/20, 3/22  HBO What Is Addiction     Defense Mechanisms  3/6/18, 3/8/18 Medical Aspects 1     Family Roles   Medical Aspects 2     Goodbye Letter  6/5, 5/7  National Geographic: Stress     Intimacy  5/10 PBS Depression Out of the Shadows     Short term and long term effect  The Anonymous People    Socialization Skills  2/8/18, 3/27/18 [place]     Feelings  4/3, 4/5  Julian Jean \"Highjacked Brain\"    ABC Model of Emotion   Rambo Stone Humor in [place]x    Grief and Loss  5/29  The Mindfulness Movie    Healthy vs. Unhealthy Feelings   Jun GARCIA documentary     Meditation/Mindfulness   Nicotine Cessation  5/3/18   Overconfidence/Complacency  5/22/18, 5/24/18  Masks  6/19/18, 6/21/18    Resentments   8 Dimensions of Wellness by [facility]    Relationship  7/12/18  Holiday and Stress  7/3/18, 7/5/18       "

## 2021-06-19 NOTE — PROGRESS NOTES
Individual Treatment Plan   Patient  Name: Tyler Ramsey  MRN: [id number]                   : 1985  Admit Date: 2018  Date of Initial Service Plan: 2017   Tentative Discharge Date: 2018      Counselor: REG Nathan        Dimension 1: Acute Intoxication/Withdrawal Potential, Risk level: 0     Problem: Last use of marijuana mid-May 2018.  UA came positive on 18 but all other UA since 18 are clean.   Patient does not report any concern related to withdrawals at this time   Goal: Maintain sobriety   Must be reached to complete treatment? Yes   Methods/Strategies (must include amount and frequency): Patient will comply with weekly UA and stay sober.    Target Date: 2018   Completion Date:            Dimension 2: Biomedical Conditions/Complications, Risk level: 1     Problem: Patient reports no major health concern at this time. Pt reports he smokes 4-5 cigars a day.     Goal: learn about nicotine health impact and to reduce the use  Must be reached to complete treatment? Yes   Methods/Strategies (must include amount and frequency): Pt will participate in nurse health education and CD groups to learn about nicotine's health effect.    Target Date: 2018   Completion Date:      Problem: Patient does not have primary care doctor   Goal: to manage health by establishing relationship with primary care doctor   Must be reached to complete treatment? Yes  Methods/Strategies (must include amount and frequency): I will establish a primary care before discharge from treatment.    Target Date: 2018   Completion Date:         Dimension 3: Emotional/Behavioral/Cognitive, Risk level: 1     Problem: No formal diagnoses of mental illnesses. Lack of reading skills   Goal: To gain reading skills to enhance self esteem   Must be reached to complete treatment? Yes  Methods/Strategies (must include amount and frequency):  I will schedule  with a group member.   15 min each time before CD group.   Target Date: 12/31/2018   Completion Date:      Problem: Patient is on probation but relapsed in May.    Goal: To learn to live sober life style   Must be reached to complete treatment? Yes  Methods/Strategies (must include amount and frequency): I will gain more addiction knowledge by attending CD groups at [facility].  Tuesdays and Thursdays 12:30-3:30pm, Room G 725.    Target Date: 12/31/2018  Completion Date:          Dimension 4: Readiness to Change, Risk level 1     Problem: Pt is on probation and mandated to complete treatment    Goal: comply with probation by staying sober    Must be reached to complete treatment? Yes  Methods/Strategies (must include amount and frequency):  I will maintain contact with my  Shayla Addison at [phone].   I will submit clean UAs and attend treatment groups.   If I cannot make to group, I will call my counselor in advance.  [phone].    Target Date: 12/31/2018   Completion Date:            Dimension 5: Relapse/Continued Use/Continued Problem Potential, Risk level: 2     Problem: Last use of marijuana in May 2018 while in treatment.  Patient's UA have been clean since then.    Goal: be able to find meaning in sobriety and gain foundation for life long sobriety.    Must be reached to complete treatment? Yes  Methods/Strategies (must include amount and frequency): I will participate in group check in and be honest about my cravings.    Target Date:  12/31/2018   Completion Date:             Dimension 6: Recovery Environment, Risk level: 2     Problem: Patient is on social security disability and lives in a subsidized housing.  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    Goal: gain structure in my day and be constructive   Must be reached to complete treatment? Yes  Methods/Strategies (must include amount and frequency):  I will attend two 3-hour CD groups per  week to add structure in my life.    Target Date: 12/31/2017   Completion Date:      Problem: Lack of structure and resources to maintain healthy life style   Goal:  Gain structure and sober support resources in community   Must be reached to complete treatment? Yes  Methods/Strategies (must include amount and frequency):  I will participate in monthly recreational/sober group at [facility].      Target Date: 12/31/2018   Completion Date:            Resources  Resources to which the patient is being referred for problems when problems are to be addressed concurrently by another provider: [facility]         By signing this document, I am acknowledging that I was actively and directly involved in the development of my treatment plan.                                                                                                                                              Patient  Signature_________________________________________         Date__________________          Staff Signature  Martha Heredia [facility]    Date: 7/13/2018 8:40 am

## 2021-06-19 NOTE — PROGRESS NOTES
LATE ENTRY FOR WEEK OF 8/10/18      Weekly Progress Note  Patient Name: Tyler Ramsey   : 1985   MRN: [id number]    D) Patient attended 1/2 groups this week with one absence. Patient had no  individual sessions this week. A) Staff facilitated groups and reviewed tx progress. [facility] for [facility]. R) No VAP needed at this time.   Any significant events, defines as events that impact patient s relationship with others inside and outside of treatment: none reported  Indicate any changes or monitoring of physical or mental health problems: no     Indicate involvement by any outside supports: 12 Step meeting at [facility] reviewed and modified as needed. N/A   Pt working on the following dimensions  Dimension #1 - Withdrawal Potential - Risk 0.  Patient's UAs have been clean since .    No withdrawal concern reported by patient or observed.    Specific goals from treatment plan addressed this week:  Turn in clean UA   Effectiveness of strategies: Effective.       Dimension #2 - Biomedical - Risk 0. No medical issues reported by patient.  On going cigarette use.    Specific goals from treatment plan addressed this week: Pt reports he goes to [employer] to exercise.   Effectiveness of strategies: Effective.  Patient has health insurance to access medical care if necessary.  Patient is not reporting any major medical issues at this time.      Dimension #3 - Emotional/Behavioral/Cognitive - Risk 2.  The patient has no formal diagnoses of mental illnesses. Patient attended only 1 hour of group this week.  Pt called in later to explained why he left the group early on Tuesday and why he skipped the Thursday group.    Specific goals from treatment plan addressed this week: Attend all CD groups    Effectiveness of strategies:  Patient seems he is not utilizing group for when it is most appropriate.     Dimension #4 - Treatment Acceptance/Resistance - Risk 2.  [facility]. PO, Shayla Addison.  UA clean since 5/29.    Specific goals from treatment plan addressed this week: Patient to comply with probation and treatment requirements.    Effectiveness of strategies: UA have been clean since 5/29 but his group attendance has gotten sporadic     Dimension #5 - Relapse Potential - Risk 2.   Pt identifies his triggers are some using friends and staying away from them at this time.  Pt sees vague on his triggers and can create more concrete prevention plan.  Pt previously identified that not reading well causes some self esteem issues.    Specific goals from treatment plan addressed this week: Relapse prevention skills and improving self confidence by gaining  reading skills   Effectiveness of strategies:   Pt  has not initiated reading tutoring yet.      Dimension #6 - Recovery Environment - Risk 2.  Patient has stable housing and good family support.  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 Pt identifies he has to learn to read before he can achieve his goal of becoming a business owner.      Pt lives in a subsidized apartment and has worked in the past occasionally for landscape and construction.  Pt currently does not have any job and his days lacks structure. The record shows patient goes to 12 Step meeting in [place]. Patient is single, no children and states he would like to stay that way to focus on his own life for now. Pt states he has an supportive family.     Specific goals from treatment plan addressed this week:  Develop and maintain sober support network.    Effectiveness of strategies:  Effective.  Patient can benefit from gaining more structure in life.      T) Treatment plan updated: N/A. Patient notified and in agreement: Yes   Patient was educated on Automatic Negative Thoughts.   Patient has completed 117 of 226 program hours at this time. Projected discharge date is 12/31/2018.  "Current discharge plan is TBD.      Dana Hutchison MA, [facility]        Psycho-Educational Curriculum  Date Attended  Psycho-Educational Curriculum  Date Attended    Acceptance   Shame/Guilt     1st Step   Anger/Rage     Affirmations  4/24/18, 4/26/18 Mental Health  7/24/18    Automatic Negative Thoughts  7/31/18  Anxiety     Cross Addiction  2/20, 2/22  Co-Occurring Disorders     Stages of Change  4/10/, 4/12  Carolyn/Bipolar     Relapse   Trauma   2/14, 2/15    Addictive Thoughts  3/13. 3/15  Victim Identity     Coping Skills  8/7 Sober Structure  6/12   Relapse Prevention   Continuum of Care     Medical Aspects   Non-12 Step Support     Brain/Neurotransmitters   Priorities     Medication Compliance  5/15  Spirituality  2/27, 3/1    ADELE Alcohol/Drug Research   Weekend Planner     Physical Health   Educational Videos     Post Acute Withdrawal   1st Step     Pregnancy and Drug Use   2nd Step     Sexual Health  5/15 Assertive Communication     Short-Term/Long-Term Effects   My name is Jun GARCIA    Relationships  3/29 Cross Addiction     Assertive Communication  3/20, 3/22,  God As We Understood Him     Boundaries  3/20, 3/22  HBO Relapse     Codependence   3/20, 3/22  HBO What Is Addiction     Defense Mechanisms  3/6/18, 3/8/18 Medical Aspects 1     Family Roles   Medical Aspects 2     Goodbye Letter  6/5, 5/7  [employer] Geographic: Stress     Intimacy  5/10 PBS Depression Out of the Shadows     Short term and long term effect  The Anonymous People    Socialization Skills  2/8/18, 3/27/18 [place]     Feelings  4/3, 4/5  Julian Jean \"Highjacked Brain\"    ABC Model of Emotion   Rambo Stone Humor in Tx    Grief and Loss  5/29  The Mindfulness Movie    Healthy vs. Unhealthy Feelings   Jun GARCIA documentary     Meditation/Mindfulness   Nicotine Cessation  5/3/18   Overconfidence/Complacency  5/22/18, 5/24/18  Masks  6/19/18, 6/21/18    Resentments   8 Dimensions of Wellness by [facility]    Relationship  7/12/18  Holiday and Stress  " 7/3/18, 7/5/18

## 2021-06-19 NOTE — PROGRESS NOTES
Weekly Progress Note  Patient Name: Tyler Ramsey   : 1985   MRN: [id number]    D) Patient attended 1/2 groups this week with one absence. Patient had no  individual sessions this week. A) Staff facilitated groups and reviewed tx progress. Assessed for [facility]. R) No VAP needed at this time.   Any significant events, defines as events that impact patient s relationship with others inside and outside of treatment: no   Indicate any changes or monitoring of physical or mental health problems: no     Indicate involvement by any outside supports: 12 Step meeting at [facility] reviewed and modified as needed. N/A   Pt working on the following dimensions  Dimension #1 - Withdrawal Potential - Risk 0.  Patient's UAs have been clean since .    No withdrawal concern reported by patient or observed.  Pt reports he has 40 days sobriety this week.    Specific goals from treatment plan addressed this week:  Turn in clean UA   Effectiveness of strategies: Effective.       Dimension #2 - Biomedical - Risk 0. No medical issues reported by patient, however patient admits he is still smokes 3-4 cigars ad week.  Pt reports he is working toward reducing cigar use.    Specific goals from treatment plan addressed this week: Pt reports he goes to [employer] to exercise.  Primary care established at [facility] with Dr. Roberto Gunter.    Effectiveness of strategies: Effective.  Patient has health insurance to access medical care if necessary.  Patient is not reporting any major medical issues at this time.      Dimension #3 - Emotional/Behavioral/Cognitive - Risk 1.  The patient has no formal diagnoses of mental illnesses. Pt read out more reading materials in group today. Pt seemed to struggle with some words but he did not hesitate to read and participated in group.  Pt said he is ready to communicate with a group member about starting the reading mentor.    Specific goals from treatment plan addressed this  week: Attend all CD groups    Effectiveness of strategies:  Effective.  Patient states he was unable to attend Tuesday group because he was assisting his sister's upcoming wedding preparation.  Pt came to Thursday group.      Dimension #4 - Treatment Acceptance/Resistance - Risk 0.  [facility] Probation mandated to finish treatment. PO, Shayla Addison.  UA clean since 5/29.    Specific goals from treatment plan addressed this week: Patient to comply with probation and treatment requirements.    Effectiveness of strategies:  Effective.      Dimension #5 - Relapse Potential - Risk 2.  Sober for a month.  Pt identifies his triggers are some using friends and staying away from them at this time.    Specific goals from treatment plan addressed this week: Relapse prevention skills and improving self confidence.      Effectiveness of strategies:   Effective. Patient seems he is practicing new awareness into action to stay sober.      Dimension #6 - Recovery Environment - Risk 1.  Patient has stable housing and good family support.  Patient is on social security disability and receives $703/month. 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  The record shows patient goes to 12 Step meeting in [place]. Patient is single, no children and states he would like to stay that way to focus on his own life for now. Pt states he has an supportive family.   Specific goals from treatment plan addressed this week:  Develop and maintain sober support network.    Effectiveness of strategies:  Effective.  Patient can benefit from gaining more structure in life.      T) Treatment plan updated: N/A. Patient notified and in agreement: Yes   Patient was educated on Relationship.   Patient has completed 110 of 226 program hours at this time. Projected discharge date is 12/31/2018. Current discharge plan is TBD.      Martha [facility]      Psycho-Educational Curriculum  Date Attended   "Psycho-Educational Curriculum  Date Attended    Acceptance   Shame/Guilt     1st Step   Anger/Rage     Affirmations  4/24/18, 4/26/18 Mental Health     Automatic Negative Thoughts   Anxiety     Cross Addiction  2/20, 2/22  Co-Occurring Disorders     Stages of Change  4/10/, 4/12  Carolyn/Bipolar     Relapse   Trauma   2/14, 2/15    Addictive Thoughts  3/13. 3/15  Victim Identity     Coping Skills   Sober Structure  6/12   Relapse Prevention   Continuum of Care     Medical Aspects   Non-12 Step Support     Brain/Neurotransmitters   Priorities     Medication Compliance  5/15  Spirituality  2/27, 3/1    ADELE Alcohol/Drug Research   Weekend Planner     Physical Health   Educational Videos     Post Acute Withdrawal   1st Step     Pregnancy and Drug Use   2nd Step     Sexual Health  5/15 Assertive Communication     Short-Term/Long-Term Effects   My name is Jun GARCIA    Relationships  3/29 Cross Addiction     Assertive Communication  3/20, 3/22,  God As We Understood Him     Boundaries  3/20, 3/22  HBO Relapse     Codependence   3/20, 3/22  HBO What Is Addiction     Defense Mechanisms  3/6/18, 3/8/18 Medical Aspects 1     Family Roles   Medical Aspects 2     Goodbye Letter  6/5, 5/7  [employer] Geographic: Stress     Intimacy  5/10 PBS Depression Out of the Shadows     Short term and long term effect  The Anonymous People    Socialization Skills  2/8/18, 3/27/18 [place]     Feelings  4/3, 4/5  Julian Jean \"Highjacked Brain\"    ABC Model of Emotion   Rambo Stone Humor in Tx    Grief and Loss  5/29  The Mindfulness Movie    Healthy vs. Unhealthy Feelings   Jun GARCIA documentary     Meditation/Mindfulness   Nicotine Cessation  5/3/18   Overconfidence/Complacency  5/22/18, 5/24/18  Masks  6/19/18, 6/21/18    Resentments   8 Dimensions of Wellness by [facility]    Relationship  7/12/18  Holiday and Stress  7/3/18, 7/5/18       "

## 2021-06-19 NOTE — PROGRESS NOTES
Weekly Progress Note  Patient Name: Tyler Ramsey   : 1985   MRN: [id number]    D) Patient attended 1/2 groups this week with two absence. Patient had no  individual sessions this week. A) Staff facilitated groups and reviewed tx progress. Ass[facility] for [facility]. R) No VAP needed at this time.   Any significant events, defines as events that impact patient s relationship with others inside and outside of treatment: Pt reports that he learned of his friend's mother's death.    Indicate any changes or monitoring of physical or mental health problems: no     Indicate involvement by any outside supports: 12 Step meeting at [facility] reviewed and modified as needed. N/A   Pt working on the following dimensions  Dimension #1 - Withdrawal Potential - Risk 0.  Patient's UAs have been clean since .    No withdrawal concern reported by patient or observed.    Specific goals from treatment plan addressed this week:  Turn in clean UA   Effectiveness of strategies: Effective.       Dimension #2 - Biomedical - Risk 0. No medical issues reported by patient.  On going cigarette use.    Specific goals from treatment plan addressed this week: Pt reports he goes to [employer] to exercise.  Primary care scheduled on  at [facility] with Dr. Roberto Gunter.    Effectiveness of strategies: Effective.  Patient has health insurance to access medical care if necessary.  Patient is not reporting any major medical issues at this time.      Dimension #3 - Emotional/Behavioral/Cognitive - Risk 2.  The patient has no formal diagnoses of mental illnesses. Patient attended only 1 hour of group this week.  Pt called in later to explained why he left the group early on Tuesday and why he skipped the Thursday group.  Pt said he received a phone call in the middle of the Tuesday group to find out that his friend's mother . Pt said he did not want to share this in the group yet so he left. Pt explained he had to help  his friend with  arrangement on Thursday.    Specific goals from treatment plan addressed this week: Attend all CD groups    Effectiveness of strategies:  Patient seems he is not utilizing group for when it is most appropriate.     Dimension #4 - Treatment Acceptance/Resistance - Risk 2.  [facility] Probation mandated to finish treatment. PO, Shayla Addison.  UA clean since .    Specific goals from treatment plan addressed this week: Patient to comply with probation and treatment requirements.    Effectiveness of strategies: UA have been clean since  but his group attendance has gotten sporadic     Dimension #5 - Relapse Potential - Risk 2.   Pt identifies his triggers are some using friends and staying away from them at this time.  Pt sees vague on his triggers and can create more concrete prevention plan.  Pt previously identified that not reading well causes some self esteem issues.    Specific goals from treatment plan addressed this week: Relapse prevention skills and improving self confidence by gaining  reading skills   Effectiveness of strategies:   Pt  has not initiated reading tutoring yet.      Dimension #6 - Recovery Environment - Risk 2.  Patient has stable housing and good family support.  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 Pt identifies he has to learn to read before he can achieve his goal of becoming a business owner.      Pt lives in a subsidized apartment and has worked in the past occasionally for landscape and construction.  Pt currently does not have any job and his days lacks structure. The record shows patient goes to 12 Step meeting in [place]. Patient is single, no children and states he would like to stay that way to focus on his own life for now. Pt states he has an supportive family.     Specific goals from treatment plan addressed this week:  Develop and maintain sober support  "network.    Effectiveness of strategies:  Effective.  Patient can benefit from gaining more structure in life.      T) Treatment plan updated: N/A. Patient notified and in agreement: Yes   Patient was educated on Automatic Negative Thoughts.   Patient has completed 114 of 226 program hours at this time. Projected discharge date is 12/31/2018. Current discharge plan is TBD.      REG Nathan      Psycho-Educational Curriculum  Date Attended  Psycho-Educational Curriculum  Date Attended    Acceptance   Shame/Guilt     1st Step   Anger/Rage     Affirmations  4/24/18, 4/26/18 Mental Health  7/24/18    Automatic Negative Thoughts  7/31/18  Anxiety     Cross Addiction  2/20, 2/22  Co-Occurring Disorders     Stages of Change  4/10/, 4/12  Carolyn/Bipolar     Relapse   Trauma   2/14, 2/15    Addictive Thoughts  3/13. 3/15  Victim Identity     Coping Skills   Sober Structure  6/12   Relapse Prevention   Continuum of Care     Medical Aspects   Non-12 Step Support     Brain/Neurotransmitters   Priorities     Medication Compliance  5/15  Spirituality  2/27, 3/1    ADELE Alcohol/Drug Research   Weekend Planner     Physical Health   Educational Videos     Post Acute Withdrawal   1st Step     Pregnancy and Drug Use   2nd Step     Sexual Health  5/15 Assertive Communication     Short-Term/Long-Term Effects   My name is Jun Nicholson  3/29 Cross Addiction     Assertive Communication  3/20, 3/22,  God As We Understood Him     Boundaries  3/20, 3/22  HBO Relapse     Codependence   3/20, 3/22  HBO What Is Addiction     Defense Mechanisms  3/6/18, 3/8/18 Medical Aspects 1     Family Roles   Medical Aspects 2     Goodbye Letter  6/5, 5/7  [employer] Geographic: Stress     Intimacy  5/10 PBS Depression Out of the Shadows     Short term and long term effect  The Anonymous People    Socialization Skills  2/8/18, 3/27/18 [place]     Feelings  4/3, 4/5  Julian Jean \"Highjacked Brain\"    ABC Model of Emotion   Rambo Stone " Humor in Tx    Grief and Loss  5/29  The Mindfulness Movie    Healthy vs. Unhealthy Feelings   Jun GARCIA documentary     Meditation/Mindfulness   Nicotine Cessation  5/3/18   Overconfidence/Complacency  5/22/18, 5/24/18  Masks  6/19/18, 6/21/18    Resentments   8 Dimensions of Wellness by [facility]    Relationship  7/12/18  Holiday and Stress  7/3/18, 7/5/18

## 2021-06-20 NOTE — PROGRESS NOTES
Psychology Note:    Reviewed patient's comprehensive assessment that was completed at admission.  Met with patient today to get updated information on treatment plan and mental health and substance use symptoms.  The treatment plan is appropriate for the patient, and I am in agreement with the treatment plan.  Will continue to follow treatment plan updates.      Ynes Frankel Psy.D., BORIS, [employer], [employer]
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
## 2021-06-20 NOTE — PROGRESS NOTES
D) patient discharged form outpatient treatment with staff approval.    T) discharge summary faxed to probation and mailed to the patient
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
## 2021-06-20 NOTE — PROGRESS NOTES
Weekly Progress Note  Patient Name: Tyler Ramsey   : 1985   MRN: [id number]     D) Patient attended 1/2 groups this week with one absence. Patient had no  individual sessions this week. A) Staff facilitated groups and reviewed tx progress. Assessed for [facility]. R) No VAP needed at this time.   Any significant events, defines as events that impact patient s relationship with others inside and outside of treatment: none reported  Indicate any changes or monitoring of physical or mental health problems: no      Indicate involvement by any outside supports: 12 Step meeting at [facility] reviewed and modified as needed. N/A   Pt working on the following dimensions  Dimension #1 - Withdrawal Potential - Risk 0.   UA from this week was clean.   Last positive UA was from 18.  Pt states he is sober for three months.  No withdrawal concern reported by patient or observed.    Specific goals from treatment plan addressed this week:  Turn in clean UA.   Effectiveness of strategies: Effective.        Dimension #2 - Biomedical - Risk 0. No medical issues reported by patient.  On going cigarette use.    Specific goals from treatment plan addressed this week: Pt reports he goes to [employer] to exercise.   Effectiveness of strategies: Effective.  Patient has health insurance to access medical care if necessary.  Patient is not reporting any major medical issues at this time.       Dimension #3 - Emotional/Behavioral/Cognitive - Risk 1.  The patient has no formal diagnoses of mental illnesses.  Pt shared this week that his girlfriend is 5 months pregnant.  Pt shared an emotional story about how his girlfriend lost their  baby first time time due to miscarriage. Pt admitted he was surprse by the news and felt unprepared at first, which caused some stress on their relationship.  Pt said he took some time (including not coming to group) and finally realized he wants this baby the best life and he wants to change  his life for the better at the same time.   Pt shared his chinedu and said he hoped it to be a boy.   Awareness of becoming a father seemed impacted patient in very positive way.  Pt said he  contacted probation about wanting to get a job. Pt said probation was supportive of it and even suggested to get him off probation early. Patient is scheduled to meet with CD counselor on 9/4 to discuss his discharge plan.    Specific goals from treatment plan addressed this week: Attend all CD groups    Effectiveness of strategies:  Pt explained that his absence in the recent time was due to his girlfriend being pregnant.         Dimension #4 - Treatment Acceptance/Resistance - Risk 2.  [facility] Probation mandated to finish treatment. PO, Shayla Addison.  UA clean since 5/29.    Specific goals from treatment plan addressed this week: Patient to comply with probation and treatment requirements.    Effectiveness of strategies: Patient is staying clean and participating in group      Dimension #5 - Relapse Potential - Risk 2.   Pt is maintaining sobriety since mid-May. Pt states now that his  offered early probation release, he is excited to remain sober and even start looking for a job.     Specific goals from treatment plan addressed this week: Relapse prevention skills and improving self confidence by gaining  reading skills   Effectiveness of strategies: Patient seems he has gained some skills to stay sober.       Dimension #6 - Recovery Environment - Risk 2.  Patient has stable housing and good family support.  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  Patient learned he will be a father in January 2019, which changed his plan for the future. Pt said he needs to get a job and prepare for the child birth. Pt states he is accompanying his girlfriend to prenatal care appointments and learning about the whole process. Pt stats he  will be able to learn the gender of the child soon.    Specific goals from treatment plan addressed this week: Complete CD treatment and start looking for a job  Effectiveness of strategies:  Pt is scheduled to talk about his progress/discharge plan with counselor on 9/4.       T) Treatment plan updated: N/A. Patient notified and in agreement: Yes   Patient was educated on [facility]-the past.   Patient has completed 126 of 226 program hours at this time. Projected discharge date is 12/31/2018. Current discharge plan is TBD.       Martha Heredia MA [facility]          Psycho-Educational Curriculum  Date Attended  Psycho-Educational Curriculum  Date Attended    Acceptance    Shame/Guilt      1st Step    Anger/Rage      Affirmations  4/24/18, 4/26/18 Mental Health  7/24/18    Automatic Negative Thoughts  7/31/18  Anxiety      Cross Addiction  2/20, 2/22  Co-Occurring Disorders   8/23/18    Stages of Change  4/10/, 4/12  Carolyn/Bipolar      Relapse    Trauma   2/14, 2/15    Addictive Thoughts  3/13. 3/15  Victim Identity      Coping Skills  8/7 Sober Structure  6/12   Relapse Prevention    Continuum of Care      Medical Aspects    Non-12 Step Support      Brain/Neurotransmitters    Priorities      Medication Compliance  5/15  Spirituality  2/27, 3/1    ADELE Alcohol/Drug Research    Weekend Planner      Physical Health    Educational Videos      Post Acute Withdrawal    1st Step      Pregnancy and Drug Use    2nd Step      Sexual Health  5/15 Assertive Communication      Short-Term/Long-Term Effects    My name is Jun Nicholson  3/29 Cross Addiction      Assertive Communication  3/20, 3/22,  God As We Understood Him      Boundaries  3/20, 3/22  HBO Relapse      Codependence   3/20, 3/22  HBO What Is Addiction      Defense Mechanisms  3/6/18, 3/8/18 Medical Aspects 1      Family Roles    Medical Aspects 2      Goodbye Letter  6/5, 5/7  National Geographic: Stress      Intimacy  5/10 PBS Depression Out of the  "Shadows      Identifying Self 8/16/18 The Anonymous People     Socialization Skills  2/8/18, 3/27/18 [place]      Feelings  4/3, 4/5  Julian Jean \"Highjacked Brain\"    ABC Model of Emotion    Rambo Stone Humor in Tx     Grief and Loss  5/29  The Mindfulness Movie     Healthy vs. Unhealthy Feelings    Jun GARCIA documentary      Meditation/Mindfulness    Nicotine Cessation  5/3/18   Overconfidence/Complacency  5/22/18, 5/24/18  Masks  6/19/18, 6/21/18    Resentments    8 Dimensions of Wellness by [facility]     Relationship  7/12/18  Holiday and Stress  7/3/18, 7/5/18           "
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
## 2021-06-20 NOTE — PROGRESS NOTES
D) Patient completed 3 hours of CD Outpatient/Service Coordination group with total group size of 6.   A) Pt shared he had to distance himself for about a week from his girlfriend.  Pt explained that his girlfriend had a miscarriage in the past but she found out she was 5 months pregnant.  Pt said she was talking too much and getting him irritated about the future and he had to put a distance between them.  Pt said accompanied her to the prenatal appointments and looked at ultrasound.  Pt states he is looking forward to the birth of the baby.  Pt states he talked about this with his  and his desire to look for a job.  Pt states PO is willing to get him off the probation early.   T) Patient was scheduled for 1:1 for next week to review his progress

## 2021-06-20 NOTE — PROGRESS NOTES
D) Patient completed 3 hours of CD Outpatient/Service Coordination group with total group size of 7   A) Patient celebrated his graduation in group.

## 2021-06-20 NOTE — PROGRESS NOTES
D) Patient completed 3 hours of CD Outpatient/Service Coordination group with total group size of 5.    A) Patient met with Dr. Frankel as well.   Pt explained he was unable to do UA last week because the order .  New order was placed.

## 2021-07-04 NOTE — PROGRESS NOTES
"Rule 31 by Lilian Parker LADC at 2017 10:30 AM     Author: Lilian Parker LADC Service: Addiction Care Author Type: Licensed Alcohol and Drug Counselor    Filed: 2017  4:26 PM Encounter Date: 2017 Status: Signed    : Lilian Parker LADC (Licensed Alcohol and Drug Counselor)         [facility] Assessment Summary  Date: 2017        : REG Rowe    Name: Tyler Ramsey  Address: [address] [place] [address]  Phone: [phone] (home)   Referral Source: Probation  : 1985  Age: 31 y.o.  Race/Ethnicity: Black or   Marital Status: Singe  Employment: Not employed                                                                                                                       Level of Education: 12/diploma  Socio-economic (yearly Income) Status: DK  Sexual Orientation: hetero   Last 4 digits of Social Security: 3523    Reason for seeking services:    Smoking marijuana while on probation    Dimension I Acute intoxication/Withdrawal Potential:    Symptomology (past 12 months, check all that apply)  None    Observed or reported (withdrawal symptoms, check all that apply)  None    Chemical use most recent 12 months outside a facility and other significant use history (client self-report)  Primary Drug Used  Age of First Use  Most Recent Pattern of Use and Duration    Date of last use and time, if needed  Withdrawal Potential? Requiring special care  Method of use   (oral, smoked, snort, IV, etc)    Alcohol         Marijuana/Hashish  9 Most recently 1X/day 2017 no smoke   Cocaine/Crack         Meth/Amphetamines         Heroin         Other Opiates/Synthetics         Inhalants         Benzodiazepines         Hallucinogens         Barbiturates/Sedatives/Hypnotics         Over-the-Counter Drugs         Other         Nicotine  30 Daily mini cigars broken down into \"joints\" (cigs) 4-5 day 2017         Dimension I Risk Ratin  Reason Risk " "Rating Assigned: Client reports no WD symptoms currently and none when he has had periods of sobriety in the past.  None observed. Reports last date of use (marijuana) was 2017.        Dimension II Biomedical Conditions:    Any known health conditions: No    Ever previously treated/diagnosed with any eating disorder?  no     List Health Concerns/Conditions Reported: NKA    Are Health Concerns/Conditions being treated? NA  By Whom? NA    Are you pregnant: NA OB care received:NA CPS call needed: NA        Dimension II Risk Ratin  Reason Risk Rating Assigned: Client reports he doesn't have a PCP although he did see a doctor a few months ago.  He reports no current medical conditions, no hx of chronic conditions, NKA.  Client reports he can seek care when needed despite not having a PCP or clinic to use for routine concerns.        Dimension III Emotional/Behavioral/Cognitive:    Oriented to person, place, time, situation?  Yes     Current Mental Health Services: no-has seen psychiatrist previously    Past Hospitalization for MH or psychiatric problems: No    How many Hospitalizations: NA   Last Hospitalization; date and location: NA      Past or Current Issues with Gambling (Explain): no    Prior Treatment for Gambling: No     MH Diagnoses:  Past-\"nerve problems\" and learning disability    Psychiatrist: No current, doesn't remember name from past     Clinic: NA      Current Psychotropic Medications:  None    Taking medications as prescribed:  NA  Medications Helpful: NA    Current Suicidal Ideation: No  If yes, any plan? NA  What is plan?: NA    Previous Suicide Attempts?  No   Explain: NA     Current Homicidal Ideation: No  If yes, any plan? NA What is plan?: NA    Previous Homicide Attempts? No Explain: NA    Suicidal/Homicidal Ideation in last 30 days? No  Explain: NA     Family history of substance and/or mental health diagnosis/issues?  No  Explain: NA     History of abuse (Physical, Emotional, Sexual)? No " " Explain: NA       Dimension III Risk Ratin  Reason Risk Rating Assigned: Client reports no current MH symptoms, dx, services or meds.  He reports that in the past (as a teenager) he did see a psychiatrist and his dx were \"nerve problems\" and a learning disability.  No meds at that time.  He reports having difficulty reading and completing reading/writing tasks. He reports no hx of abuse, no SI/HI, past or present.  Client scored Low Risk on the PANSI screen.        Dimension IV Readiness to Change:    Mandated, or coerced into assessment or treatment:  Yes, but I feel good about it    Does client feel there is a problem:   Yes    Verbalization of need/desire to change:   Yes     Impression of : (Check all that apply):    cooperative and minimal awareness    Are there any spiritual, cultural, or other special needs to be addressed for client to be successful in treatment? no    Hazardous activities engaged in which placed self or others in danger (i.e., operating a motor vehicle, unsafe sex, sharing needles, etc.)?   Client reports none      Dimension IV Risk Ratin  Reason Risk Rating Assigned: Client reports that he is mandated to complete tx because of his continued use while on probation.  He states that this is a good thing and he is glad to be coming to treatment.  Client has continued to use while on probation despite mandates and displays ambivalence about maintaining long-term sobriety.        Dimension V Relapse/Continued Use/Continued Problem Potential     Client age at First Treatment: 25    Lifetime # of CD Treatments:  5-6 times  List program, dates, and status of completion (within last five years): [facility], [facility], [facility] and OP (first treatment).  Can't remember dates - been a long time since any tx.    Longest Period of Abstinence: couple of year starting at age 17  How did you accomplish this?   Workout, drink a lot of water are main ways plus stay " "busy,move away from it when I'm around it.     Risk Taking/Problem Behaviors Related to Use: Client reports none, except for risk of continued use while on probation.      Dimension V Risk Rating: 3  Reason Risk Rating Assigned: Client has hx of several previous treatment experiences and has resumed use afterwards.  He has some knowledge of coping strategies to prevent use but displays little knowledge of how to prevent relapse in the long term and how to arrest use if relapse occurs.      Dimension VI Recovery Environment   Family support:  Yes, mom, sister  Peer Sober Support:  Yes, \"lady friends\"    Current living circumstances:  Live by self, own apartment.      Environment supportive of recovery:  Yes, AA meetings in building also.    Specific activities participating in which do not involve substance use:  Work out, eat at different places    Specific activities participating in which do involve substance use:  Play video games    People, things that threaten recovery: death in family members. (Lot of deaths in last few years - some natural causes, some shot, some from flipping out when they lost someone close.)    Expected family involvement during treatment services:  No    Current Legal Involvement:  [facility] probation, Shayla Craft, 2nd degree assault.    Legal Consequences related to use: PV - probably 30 days    Occupational/Academic consequences related to use: None - not employed    Current support network for recovery (including community-based recovery support): Completed anger management course 16 sessions.  Not currently attending any classes or groups.    Do you belong to a [place]: No Which [place]? NA  Reside on reservation: No     Dimension VI Risk Rating: 3 Reason Risk Rating Assigned: Client reports living by himself in an apartment which he likes and that in his building there are AA meetings on site.  He has not attended any but is open to thinking about it.  Client is not employed, his days " lack structure and routine. He reports that his mother, sister and lady friends support him in general, including his sobriety.  Client reports many deaths among his family and friends over the last several years and that these have been triggers for resumed or increased use.  He likes to work out and go out to eat; also likes to play video games although that activity is associated with using.  Current [facility] probation for 2nd degree assault, PO Shayla Venancio.  Previous possession charges.  Client's phone buzzed very frequently during his intake appt - when asked about this he stated that it was his mom and other ladies checking up on him.          DSM-V Criteria for Substance Abuse  Instructions:  Determine whether the client currently meets the criteria for a Substance Use Disorder using the diagnostic criteria in the  DSM-V, pp. 481-589. Current means during the most recent 12 months outside a facility that controls access to substances.    Category of substance Severity ICD-10 Code/DSM V Code  Alcohol Use Disorder Mild  Moderate  Severe (F10.10) (305.00)  (F10.20) (303.90)  (F10.20) (303.90)   Cannabis Use Disorder Mild  Moderate  Severe (F12.10) (305.20)  (F12.20) (304.30)  (F12.20) (304.30)   Hallucinogen Use Disorder Mild  Moderate  Severe (F16.10) (305.30)  (F16.20) (304.50)  (F16.20) (304.50)   Inhalant Use Disorder Mild  Moderate  Severe (F18.10) (305.90)  (F18.20) (304.60)  (F18.20) (304.60)   Opioid Use Disorder Mild  Moderate  Severe (F11.10) (305.50)  (F11.20) (304.00)  (F11.20) (304.00)   Sedative, Hypnotic, or Anxiolytic Use Disorder Mild  Moderate  Severe (F13.10) (305.40)  (F13.20) (304.10)  (F13.20) (304.10)   Stimulant Related Disorders Mild              Moderate              Severe   (F15.10) (305.70) Amphetamine type substance  (F14.10) (305.60) Cocaine  (F15.10) (305.70) Other or unspecified stimulant    (F15.20) (304.40) Amphetamine type substance  (F14.20) (304.20) Cocaine  (F15.20)  (304.40) Other or unspecified stimulant    (F15.20) (304.40) Amphetamine type substance  (F14.20) (304.20) Cocaine  (F15.20) (304.40) Other or unspecified stimulant   DisorderTobacco use Disorder Mild  Moderate  Severe (Z72.0) (305.1)  (F17.200) (305.1)  (F17.200) (305.1)   Other (or unknown) Substance Use Disorder Mild  Moderate  Severe (F19.10) (305.90)  (F19.20) (304.90)  (F19.20) (304.90)     Diagnostic Impression: Cannabis Use Disorder-Moderate (F12.20)    Assessment Completed Within 3 Sessions of Admission: Yes  If NO, date assessment to be completed noted in Treatment Plan: NA      Signature of Counselor: REG Rowe  Date and Time of Signature: 8/8/2017, 4:24 PM

## 2021-11-02 NOTE — PROGRESS NOTES
Tyler Ramsey attended 3 hours of group therapy today.    9/12/2017 2:01 PM Radha Gaming   Detail Level: Detailed Quality 226: Preventive Care And Screening: Tobacco Use: Screening And Cessation Intervention: Patient screened for tobacco use and is an ex/non-smoker Quality 130: Documentation Of Current Medications In The Medical Record: Current Medications Documented

## 2023-01-19 NOTE — PROGRESS NOTES
Tyler Ramsey attended 3 hours of group therapy today.    9/20/2017 1:43 PM Radha Gaming   [employer] in [employer]

## 2024-09-27 NOTE — PROGRESS NOTES
Tyler Ramsey attended 3 hours of group therapy today.    10/2/2017 2:26 PM Radha Gaming   Urinalysis collected and sent to lab